# Patient Record
Sex: MALE | Race: OTHER | HISPANIC OR LATINO | Employment: FULL TIME | URBAN - METROPOLITAN AREA
[De-identification: names, ages, dates, MRNs, and addresses within clinical notes are randomized per-mention and may not be internally consistent; named-entity substitution may affect disease eponyms.]

---

## 2019-07-02 ENCOUNTER — TRANSCRIBE ORDERS (OUTPATIENT)
Dept: ADMINISTRATIVE | Facility: HOSPITAL | Age: 59
End: 2019-07-02

## 2019-07-02 DIAGNOSIS — I83.813 VARICOSE VEINS OF BILATERAL LOWER EXTREMITIES WITH PAIN: Primary | ICD-10-CM

## 2019-07-24 ENCOUNTER — CONSULT (OUTPATIENT)
Dept: VASCULAR SURGERY | Facility: CLINIC | Age: 59
End: 2019-07-24
Payer: COMMERCIAL

## 2019-07-24 VITALS
HEART RATE: 98 BPM | DIASTOLIC BLOOD PRESSURE: 80 MMHG | HEIGHT: 67 IN | BODY MASS INDEX: 30.29 KG/M2 | WEIGHT: 193 LBS | SYSTOLIC BLOOD PRESSURE: 110 MMHG | TEMPERATURE: 98.8 F

## 2019-07-24 DIAGNOSIS — I83.813 VARICOSE VEINS OF BILATERAL LOWER EXTREMITIES WITH PAIN: ICD-10-CM

## 2019-07-24 PROCEDURE — 99244 OFF/OP CNSLTJ NEW/EST MOD 40: CPT | Performed by: NURSE PRACTITIONER

## 2019-07-24 NOTE — PROGRESS NOTES
Assessment/Plan:    Varicose veins of bilateral lower extremities with pain  Symptomatic varicose veins to bilateral lower extremities, R>L, with intermittent pain  We discussed the pathophysiology of venous disease  He will trial a course of conservative therapy to include the daily use of 20-30 mmHg knee high compression stockings  We discussed the benefits of frequent elevation and regular physical activity  He will have a venous duplex to evaluate for venous incompetence  Followup with vascular surgeon after imaging to review test results, response to conservative measures and discuss further possible treatment options  Diagnoses and all orders for this visit:    Varicose veins of bilateral lower extremities with pain  -     Compression Stocking  -     Ambulatory referral to Vascular Surgery  -     VAS reflux lower limb venous duplex study with reflux assessment, complete bilateral; Future          Subjective:      Patient ID: Maryan Loo is a 63 y o  male  Patient referred by PCP for evaluation of varicose veins  He has bilateral lower extremity truncal varicosities  He complains of pain to the legs that has been ongoing for "years "  He feels like something has to be done now to help him feel better  He complains of sharp pain to the right leg overlying truncal vein to calf  He denies swelling  He works as a , so says he is constantly moving all day  His legs feel worse after work  He has never worn compression  No skin changes/tissue loss  New patient, presenting today for evaluation of varicose veins  Patient reports pain, bulging veins, swelling and burning, right is worse than left  He also reports R knee surgery on 6/13 and veins seem to have worsened since then  He does not wear compression and has not had any testing done         The following portions of the patient's history were reviewed and updated as appropriate: allergies, current medications, past family history, past medical history, past social history, past surgical history and problem list     Review of Systems   Constitutional: Negative  HENT: Positive for trouble swallowing  Eyes: Negative  Respiratory: Negative  Cardiovascular: Positive for leg swelling  Painful veins   Gastrointestinal: Negative  Endocrine: Negative  Genitourinary: Negative  Musculoskeletal: Positive for arthralgias  Leg pain   Skin: Negative  Allergic/Immunologic: Negative  Neurological: Negative  Hematological: Negative  Psychiatric/Behavioral: Negative  Objective:     Physical Exam   Constitutional: He is oriented to person, place, and time  He appears well-nourished  No distress  HENT:   Head: Normocephalic and atraumatic  Eyes: No scleral icterus  Neck: Neck supple  No JVD present  Cardiovascular: Normal rate and regular rhythm  Pulses:       Dorsalis pedis pulses are 2+ on the left side  Posterior tibial pulses are 2+ on the right side  Truncal varicosities bilateral lower extremities   Pulmonary/Chest: Effort normal  No respiratory distress  Abdominal: Soft  There is no tenderness  Musculoskeletal: He exhibits no edema  Neurological: He is alert and oriented to person, place, and time  Skin: Skin is warm and dry  Psychiatric: He has a normal mood and affect  I have reviewed and made appropriate changes to the review of systems input by the medical assistant      Vitals:    07/24/19 1459   BP: 110/80   BP Location: Right arm   Patient Position: Sitting   Pulse: 98   Temp: 98 8 °F (37 1 °C)   TempSrc: Tympanic   Weight: 87 5 kg (193 lb)   Height: 5' 7" (1 702 m)       Patient Active Problem List   Diagnosis    Varicose veins of bilateral lower extremities with pain       Past Surgical History:   Procedure Laterality Date    ADENOIDECTOMY      KNEE SURGERY      TONSILLECTOMY         Family History   Problem Relation Age of Onset  Varicose Veins Father        Social History     Socioeconomic History    Marital status: Single     Spouse name: Not on file    Number of children: Not on file    Years of education: Not on file    Highest education level: Not on file   Occupational History    Not on file   Social Needs    Financial resource strain: Not on file    Food insecurity:     Worry: Not on file     Inability: Not on file    Transportation needs:     Medical: Not on file     Non-medical: Not on file   Tobacco Use    Smoking status: Former Smoker     Types: Cigarettes    Smokeless tobacco: Never Used   Substance and Sexual Activity    Alcohol use: Yes     Frequency: Monthly or less    Drug use: Not Currently    Sexual activity: Not on file   Lifestyle    Physical activity:     Days per week: Not on file     Minutes per session: Not on file    Stress: Not on file   Relationships    Social connections:     Talks on phone: Not on file     Gets together: Not on file     Attends Judaism service: Not on file     Active member of club or organization: Not on file     Attends meetings of clubs or organizations: Not on file     Relationship status: Not on file    Intimate partner violence:     Fear of current or ex partner: Not on file     Emotionally abused: Not on file     Physically abused: Not on file     Forced sexual activity: Not on file   Other Topics Concern    Not on file   Social History Narrative    Not on file       No Known Allergies      Current Outpatient Medications:     atorvastatin (LIPITOR) 40 mg tablet, TAKE 1 TABLET(S) EVERY DAY BY ORAL ROUTE AT BEDTIME FOR 30 DAYS , Disp: , Rfl: 1    pantoprazole (PROTONIX) 40 mg tablet, Take 40 mg by mouth daily, Disp: , Rfl: 3

## 2019-07-24 NOTE — PATIENT INSTRUCTIONS
Symptomatic varicose veins to bilateral lower extremities with pain  -we will initiate a 90 day trial of conservative therapy to include the daily use of 20-30 mmHg knee-high compression stockings    Wear stockings daily during waking hours only, do not sleep in stockings, there is no benefit to sleeping in them  -other things we discussed that may help manage your symptoms:  Periodic elevation, regular physical activity and maintenance of a healthy weight  -after a trial of conservative therapy you have a venous reflux assessment to evaluate for venous insufficiency  -follow up with vascular surgeon after imaging to review test results, response to conservative therapy and discuss further possible surgical treatment options

## 2019-07-24 NOTE — ASSESSMENT & PLAN NOTE
Symptomatic varicose veins to bilateral lower extremities, R>L, with intermittent pain  We discussed the pathophysiology of venous disease  He will trial a course of conservative therapy to include the daily use of 20-30 mmHg knee high compression stockings  We discussed the benefits of frequent elevation and regular physical activity  He will have a venous duplex to evaluate for venous incompetence  Followup with vascular surgeon after imaging to review test results, response to conservative measures and discuss further possible treatment options

## 2019-07-30 ENCOUNTER — HOSPITAL ENCOUNTER (OUTPATIENT)
Dept: RADIOLOGY | Facility: HOSPITAL | Age: 59
Discharge: HOME | End: 2019-07-30
Attending: OTOLARYNGOLOGY
Payer: COMMERCIAL

## 2019-07-30 DIAGNOSIS — R13.10 DYSPHAGIA, UNSPECIFIED TYPE: ICD-10-CM

## 2019-07-30 PROCEDURE — G8998 SWALLOW D/C STATUS: HCPCS

## 2019-07-30 PROCEDURE — 92611 MOTION FLUOROSCOPY/SWALLOW: CPT

## 2019-07-30 PROCEDURE — 74230 X-RAY XM SWLNG FUNCJ C+: CPT

## 2019-07-30 PROCEDURE — G8996 SWALLOW CURRENT STATUS: HCPCS

## 2019-07-30 PROCEDURE — G8997 SWALLOW GOAL STATUS: HCPCS

## 2019-07-30 NOTE — PROCEDURES
Video Swallow Study      Patient Name: Vinicio Adam  YNQEX'V Date: 7/30/2019        Past Medical History  Past Medical History:   Diagnosis Date    GERD (gastroesophageal reflux disease)     Hyperlipidemia         Past Surgical History  Past Surgical History:   Procedure Laterality Date    ADENOIDECTOMY      KNEE SURGERY      TONSILLECTOMY           General Information:    64 yo male referred to [facility]  for a VBS by Dr Apollo Estrada for dysphagia w/  c/o choking on solids x6 months  Pt reports taking Protonix PRN, but has not been taking it frequently 2/2 decrease in symptoms/heartburn  Pt reported that he is aware that if he eats faster he has an increase in symptoms and will need to excuse himself to the bathroom and vomit the food back up  Cognition:  WFL    Speech/Swallow Mech: Oral motor movements appeared adequate; Dentition was adequate; Cough was strong/productive  Respiratory Status: RA;   Current diet: regular/thin  Prior VBS N/A    Pt was seen in radiology for a Video Barium Swallow Study, standing and viewed laterally with the following consistencies: puree, soft solid, hard solid, HTL, NTL, thin, barium tablet whole with thin by cup      Results are as follows:     **Images are available for review on PACS          Oral Stage:   Pt self fed w/o difficulty  Mastication was complete; however, did present with extremely fast oral processing of solids  Oral control, posterior transfer, and BOT retraction were adequate  No premature spill  Pharyngeal Stage:   Timely pharyngeal swallow with adequate hyoalryngeal excursion  Epiglottic inversion was minimally delayed but complete  High penetration with thin liquids via successive sips  No pharyngeal retention or aspiration  Esophageal Stage:   Screen completed  Narrowing in proximal esophagus near UES that did not impede passage of food/liquid   Possible early stages of esophageal bar forming  Retroflow of puree and liquid seen from distal esophagus to high mid region  The barium tablet did become stasis at UES impacting esophageal clearance of food and liquid  Delayed emptying of the food/liquid without passage of the barium tablet-required large glass of warm water over >10 minutes to break down tablet  Assessment Summary:   Pt presents with a functional oropharyngeal swallow  Symptoms may be more esophageal related 2/2 observed retroflow of material within the esophagus and decreased ability to pass the barium tablet through LES requiring over 10 minutes to clear and multiple bolus presentations including warm water to break down the tablet         Recommendations:   -Regular with thin liquids  -Slowed rate of intake and reflux precautions   -Consider crushing/breaking meds as able  -Reflux management per ENT/GI

## 2019-10-15 ENCOUNTER — TELEPHONE (OUTPATIENT)
Dept: ADMINISTRATIVE | Facility: HOSPITAL | Age: 59
End: 2019-10-15

## 2019-10-15 NOTE — TELEPHONE ENCOUNTER
Date Time Reggie Providers Departments Visit Type                      [x] 10/31/19 9:00 AM 30 Brittaney Llanes MD [[id number]] VAS CTR [facility] [[id number]] RES REVIEW [[id number]]     aiyana unavail - called pt to rs - left message

## 2019-10-29 ENCOUNTER — HOSPITAL ENCOUNTER (OUTPATIENT)
Dept: NON INVASIVE DIAGNOSTICS | Facility: CLINIC | Age: 59
Discharge: HOME | End: 2019-10-29
Payer: COMMERCIAL

## 2019-10-29 DIAGNOSIS — I83.813 VARICOSE VEINS OF BILATERAL LOWER EXTREMITIES WITH PAIN: ICD-10-CM

## 2019-10-29 PROCEDURE — 93970 EXTREMITY STUDY: CPT

## 2019-10-30 PROCEDURE — 93970 EXTREMITY STUDY: CPT | Performed by: SURGERY

## 2019-11-01 ENCOUNTER — OFFICE VISIT (OUTPATIENT)
Dept: VASCULAR SURGERY | Facility: CLINIC | Age: 59
End: 2019-11-01
Payer: COMMERCIAL

## 2019-11-01 VITALS
BODY MASS INDEX: 30.13 KG/M2 | HEIGHT: 67 IN | WEIGHT: 192 LBS | DIASTOLIC BLOOD PRESSURE: 92 MMHG | TEMPERATURE: 99.1 F | HEART RATE: 96 BPM | SYSTOLIC BLOOD PRESSURE: 144 MMHG

## 2019-11-01 DIAGNOSIS — I83.813 VARICOSE VEINS OF BILATERAL LOWER EXTREMITIES WITH PAIN: Primary | ICD-10-CM

## 2019-11-01 PROCEDURE — 99213 OFFICE O/P EST LOW 20 MIN: CPT | Performed by: SURGERY

## 2019-11-01 NOTE — PROGRESS NOTES
Assessment/Plan:    Pt is a 62 yo w/ HLD, GERD, presents to discuss varicose veins    Varicose veins of bilateral lower extremities with pain  -varicose veins with discomfort  -reviewed reflux study which shows no deep or superficial reflux  -has been doing conservative management with compression, elevation, activity and this is controlling his sxs well  -patient most concerned with cosmetic appearance of varicose veins  -discussed taht phlebectomy would remove his veins but is unnecessary at this point as symptoms are well controlled and no underlying reflux disease; these veins are too large for treatment with sclero injections    F/u: PRN    Subjective:      Patient ID: Frank Weiss is a 63 y o  male  Patient presents in office for follow up  Patient has an LEVDR done 10/29/2019  Patient reports wearing compression socks every other day  Patient reports that bulging veins and tingling/burning/stabbing pain are not as bad as they were  Patient reports the R>L x 3 + years  Patient reports that elevation of his legs helps  Patient is taking atorvastatin  Patient reports being a social smoker  HPI:    Patient presents to discuss venous disease  Patient complains of mild discomfort in his legs, achy in feeling, R>L  He denies swelling  He has a varicose vein on the right shin that is cosmetically unpleasing to him  He works as a  and does a lot of walking at work as well  Denies hx of DVT  Positive family hx of varicose veins in his father  Since last visit, he wears his compression ~3d/wk (tries to wear for work days)  He elevates his legs  He is active  Not losing weight  He thinks that these measures are helping the discomfort in his legs  He is still unhappy with the appearance of his bulging veins but does note that they are smaller since starting conservative measures      He smokes and drinks occasionally (1x/wk)      The following portions of the patient's history were reviewed and updated as appropriate: allergies, current medications, past family history, past medical history, past social history, past surgical history and problem list     Review of Systems   Constitutional: Negative  HENT: Negative  Eyes: Negative  Respiratory: Negative  Cardiovascular: Negative for leg swelling  Painful veins   Gastrointestinal: Negative  Endocrine: Negative  Genitourinary: Negative  Musculoskeletal: Negative  Negative for gait problem  Skin: Negative  Negative for color change and wound  Allergic/Immunologic: Negative  Neurological: Negative  Hematological: Negative  Psychiatric/Behavioral: Negative  Objective:      /92 (BP Location: Left arm, Patient Position: Sitting, Cuff Size: Adult)   Pulse 96   Temp 99 1 °F (37 3 °C) (Tympanic)   Ht 5' 7" (1 702 m)   Wt 87 1 kg (192 lb)   BMI 30 07 kg/m²          Physical Exam   Constitutional: He is oriented to person, place, and time  He appears well-developed and well-nourished  HENT:   Head: Normocephalic and atraumatic  Eyes: Conjunctivae are normal    Neck: Normal range of motion  Neck supple  Cardiovascular: Normal rate, regular rhythm and normal heart sounds  Pulses:       Radial pulses are 2+ on the right side, and 2+ on the left side  Popliteal pulses are 1+ on the right side, and 1+ on the left side  Dorsalis pedis pulses are 2+ on the right side, and 2+ on the left side  Posterior tibial pulses are 2+ on the right side, and 2+ on the left side  No carotid bruits B   Pulmonary/Chest: Effort normal and breath sounds normal    Abdominal: Soft  He exhibits no distension  There is no tenderness  There is no rebound  Musculoskeletal: Normal range of motion  He exhibits no edema  Neurological: He is alert and oriented to person, place, and time  Skin: Skin is warm and dry     Moderate size varicosity over the R anterolateral shin; sparse spiders BLE   Psychiatric: He has a normal mood and affect  His behavior is normal    Nursing note and vitals reviewed  I have reviewed and made appropriate changes to the review of systems input by the medical assistant      Vitals:    11/01/19 0927   BP: 144/92   BP Location: Left arm   Patient Position: Sitting   Cuff Size: Adult   Pulse: 96   Temp: 99 1 °F (37 3 °C)   TempSrc: Tympanic   Weight: 87 1 kg (192 lb)   Height: 5' 7" (1 702 m)       Patient Active Problem List   Diagnosis    Varicose veins of bilateral lower extremities with pain       Past Surgical History:   Procedure Laterality Date    ADENOIDECTOMY      KNEE SURGERY      TONSILLECTOMY         Family History   Problem Relation Age of Onset    Varicose Veins Father        Social History     Socioeconomic History    Marital status: Single     Spouse name: Not on file    Number of children: Not on file    Years of education: Not on file    Highest education level: Not on file   Occupational History    Not on file   Social Needs    Financial resource strain: Not on file    Food insecurity:     Worry: Not on file     Inability: Not on file    Transportation needs:     Medical: Not on file     Non-medical: Not on file   Tobacco Use    Smoking status: Current Some Day Smoker     Types: Cigarettes    Smokeless tobacco: Never Used   Substance and Sexual Activity    Alcohol use: Yes     Frequency: Monthly or less    Drug use: Not Currently    Sexual activity: Not on file   Lifestyle    Physical activity:     Days per week: Not on file     Minutes per session: Not on file    Stress: Not on file   Relationships    Social connections:     Talks on phone: Not on file     Gets together: Not on file     Attends Latter day service: Not on file     Active member of club or organization: Not on file     Attends meetings of clubs or organizations: Not on file     Relationship status: Not on file    Intimate partner violence:     Fear of current or ex partner: Not on file     Emotionally abused: Not on file     Physically abused: Not on file     Forced sexual activity: Not on file   Other Topics Concern    Not on file   Social History Narrative    Not on file       No Known Allergies      Current Outpatient Medications:     atorvastatin (LIPITOR) 40 mg tablet, TAKE 1 TABLET(S) EVERY DAY BY ORAL ROUTE AT BEDTIME FOR 30 DAYS , Disp: , Rfl: 1    pantoprazole (PROTONIX) 40 mg tablet, Take 40 mg by mouth daily, Disp: , Rfl: 3

## 2019-11-01 NOTE — PATIENT INSTRUCTIONS
1) Varicose veins  -your ultrasound showed that you don't have any underlying vein disease  -please continue to wear your compression daily, doing leg elevation, stay active, and get down to a healthy weight  -if your symptoms worsen, please make another appointment to see us    Varicose Veins   AMBULATORY CARE:   Varicose veins  are veins that become large, twisted, and swollen  They are common on the back of the calves, knees, and thighs  Varicose veins are caused by valves in your veins that do not work properly  This causes blood to collect and increase pressure in the veins of your legs  The increased pressure causes your veins to stretch, get larger, swell, and twist        Common symptoms include the following: Your symptoms may be worse after you stand or sit for long periods of time  You may have any of the following:  · Blue, purple, or bulging veins in your legs     · Pain, swelling, or muscle cramps in your legs    · Feeling of fatigue or heaviness in your legs    · Cramping in your legs  Seek care immediately if:   · You have a wound that does not heal or is infected  · You have an injury that has broken your skin and caused your varicose veins to bleed  · Your leg is swollen and hard  · You notice that your legs or feet are turning blue or black  · Your leg feels warm, tender, and painful  It may look swollen and red  Contact your healthcare provider if:   · You have pain in your leg that does not go away or gets worse  · You notice sudden large bruising on your legs  · You have a rash on your leg  · Your symptoms keep you from doing your daily activities  · You have questions or concerns about your condition or care  Treatment of varicose veins  aims to decrease symptoms, improve appearance, and prevent further problems  Treatment will depend on which veins are affected and how severe your condition is  You may need procedures to treat or remove your varicose veins   For example, your healthcare provider may inject a solution or use a laser to close the varicose veins  Surgery to remove long veins may also be done  Ask your healthcare provider for more information about procedures used to treat varicose veins  Manage varicose veins:   · Do not sit or stand for long periods of time  This can cause the blood to collect in your legs and make your symptoms worse  Bend or rotate your ankles several times every hour  Walk around for a few minutes every hour to get blood moving in your legs  · Do not cross your legs when you sit  This decreases blood flow to your feet and can make your symptoms worse  · Do not wear tight clothing or shoes  Do not wear high-heeled shoes  Do not wear clothes that are tight around the waist or knees  · Maintain a healthy weight  Being overweight or obese can make your varicose veins worse  Ask your healthcare provider how much you should weigh  Ask him or her to help you create a weight loss plan if you are overweight  · Wear pressure stockings as directed  The stockings are tight and put pressure on your legs  They improve blood flow and help prevent clots  · Elevate your legs  Keep them above the level of your heart for 15 to 30 minutes several times a day  You can also prop the end of your bed up slightly to elevate your legs while you sleep  This will help blood to flow back to your heart  · Get regular exercise  Talk to your healthcare provider about the best exercise plan for you  Exercise can improve blood flow to your legs and feet  Follow up with your healthcare provider as directed:  Write down your questions so you remember to ask them during your visits  © 2017 [employer] [facility] Information is for End User's use only and may not be sold, redistributed or otherwise used for commercial purposes   All illustrations and images included in CareNotes® are the copyrighted property of A D [employer] M [employer] Inc  or [employer]  The above information is an  only  It is not intended as medical advice for individual conditions or treatments  Talk to your doctor, nurse or pharmacist before following any medical regimen to see if it is safe and effective for you

## 2020-04-18 DIAGNOSIS — E78.00 HYPERCHOLESTEROLEMIA: Primary | ICD-10-CM

## 2020-04-18 RX ORDER — ATORVASTATIN CALCIUM 40 MG/1
TABLET, FILM COATED ORAL
Qty: 30 TABLET | Refills: 1 | Status: SHIPPED | OUTPATIENT
Start: 2020-04-18 | End: 2020-05-19

## 2020-05-19 DIAGNOSIS — E78.00 HYPERCHOLESTEROLEMIA: ICD-10-CM

## 2020-05-19 RX ORDER — ATORVASTATIN CALCIUM 40 MG/1
TABLET, FILM COATED ORAL
Qty: 30 TABLET | Refills: 1 | Status: SHIPPED | OUTPATIENT
Start: 2020-05-19 | End: 2020-06-13

## 2020-05-23 DIAGNOSIS — K21.9 GASTROESOPHAGEAL REFLUX DISEASE, ESOPHAGITIS PRESENCE NOT SPECIFIED: Primary | ICD-10-CM

## 2020-05-24 RX ORDER — PANTOPRAZOLE SODIUM 40 MG/1
TABLET, DELAYED RELEASE ORAL
Qty: 30 TABLET | Refills: 1 | Status: SHIPPED | OUTPATIENT
Start: 2020-05-24 | End: 2020-06-20

## 2020-06-13 DIAGNOSIS — E78.00 HYPERCHOLESTEROLEMIA: ICD-10-CM

## 2020-06-13 RX ORDER — ATORVASTATIN CALCIUM 40 MG/1
TABLET, FILM COATED ORAL
Qty: 30 TABLET | Refills: 1 | Status: SHIPPED | OUTPATIENT
Start: 2020-06-13 | End: 2020-06-27

## 2020-06-20 DIAGNOSIS — K21.9 GASTROESOPHAGEAL REFLUX DISEASE, ESOPHAGITIS PRESENCE NOT SPECIFIED: ICD-10-CM

## 2020-06-20 RX ORDER — PANTOPRAZOLE SODIUM 40 MG/1
TABLET, DELAYED RELEASE ORAL
Qty: 30 TABLET | Refills: 1 | Status: SHIPPED | OUTPATIENT
Start: 2020-06-20 | End: 2020-06-27

## 2020-06-27 DIAGNOSIS — E78.00 HYPERCHOLESTEROLEMIA: ICD-10-CM

## 2020-06-27 DIAGNOSIS — K21.9 GASTROESOPHAGEAL REFLUX DISEASE, ESOPHAGITIS PRESENCE NOT SPECIFIED: ICD-10-CM

## 2020-06-27 PROBLEM — K22.2 SCHATZKI'S RING OF DISTAL ESOPHAGUS: Status: ACTIVE | Noted: 2020-06-27

## 2020-06-27 PROBLEM — K22.70 BARRETT'S ESOPHAGUS WITHOUT DYSPLASIA: Status: ACTIVE | Noted: 2020-06-27

## 2020-06-27 PROBLEM — M17.0 OSTEOARTHRITIS OF BOTH KNEES: Status: ACTIVE | Noted: 2020-06-27

## 2020-06-27 PROBLEM — K63.5 COLON POLYPS: Status: ACTIVE | Noted: 2020-06-27

## 2020-06-27 RX ORDER — PANTOPRAZOLE SODIUM 40 MG/1
TABLET, DELAYED RELEASE ORAL
Qty: 90 TABLET | Refills: 1 | Status: SHIPPED | OUTPATIENT
Start: 2020-06-27 | End: 2020-06-27 | Stop reason: SDUPTHER

## 2020-06-27 RX ORDER — PANTOPRAZOLE SODIUM 40 MG/1
40 TABLET, DELAYED RELEASE ORAL DAILY
Qty: 90 TABLET | Refills: 1 | Status: SHIPPED | OUTPATIENT
Start: 2020-06-27 | End: 2021-05-14

## 2020-06-27 RX ORDER — ATORVASTATIN CALCIUM 40 MG/1
40 TABLET, FILM COATED ORAL
Qty: 90 TABLET | Refills: 1 | Status: SHIPPED | OUTPATIENT
Start: 2020-06-27 | End: 2021-08-11

## 2020-06-27 RX ORDER — ATORVASTATIN CALCIUM 40 MG/1
TABLET, FILM COATED ORAL
Qty: 90 TABLET | Refills: 1 | Status: SHIPPED | OUTPATIENT
Start: 2020-06-27 | End: 2020-06-27 | Stop reason: SDUPTHER

## 2021-01-21 ENCOUNTER — TELEPHONE (OUTPATIENT)
Dept: ADMINISTRATIVE | Facility: OTHER | Age: 61
End: 2021-01-21

## 2021-01-21 NOTE — TELEPHONE ENCOUNTER
----- Message from Destinee Guerra sent at 1/20/2021 11:33 AM EST -----  Regarding: care gap request  01/20/21 11:33 AM    Hello, our patient attached above has had colonoscopy completed/performed  Please assist in updating the patient chart by closing the care gap  The date of service is 10/03/2019 and is under media date 12/28/2020 in his chart      Thank you,  Destinee Guerra  [facility]

## 2021-01-21 NOTE — TELEPHONE ENCOUNTER
Upon review of the In Basket request we were able to locate, review, and update the patient chart as requested for CRC: Colonoscopy  Any additional questions or concerns should be emailed to the Practice Liaisons via [email or web address] email, please do not reply via In Basket      Thank you  Deysi Ayala

## 2021-03-25 ENCOUNTER — TELEMEDICINE (OUTPATIENT)
Dept: FAMILY MEDICINE CLINIC | Facility: CLINIC | Age: 61
End: 2021-03-25
Payer: COMMERCIAL

## 2021-03-25 VITALS — HEIGHT: 67 IN | TEMPERATURE: 100 F | WEIGHT: 190 LBS | BODY MASS INDEX: 29.82 KG/M2

## 2021-03-25 DIAGNOSIS — B34.9 VIRAL INFECTION, UNSPECIFIED: ICD-10-CM

## 2021-03-25 DIAGNOSIS — Z20.822 EXPOSURE TO COVID-19 VIRUS: ICD-10-CM

## 2021-03-25 PROCEDURE — 99213 OFFICE O/P EST LOW 20 MIN: CPT | Performed by: FAMILY MEDICINE

## 2021-03-25 PROCEDURE — U0003 INFECTIOUS AGENT DETECTION BY NUCLEIC ACID (DNA OR RNA); SEVERE ACUTE RESPIRATORY SYNDROME CORONAVIRUS 2 (SARS-COV-2) (CORONAVIRUS DISEASE [COVID-19]), AMPLIFIED PROBE TECHNIQUE, MAKING USE OF HIGH THROUGHPUT TECHNOLOGIES AS DESCRIBED BY CMS-2020-01-R: HCPCS | Performed by: FAMILY MEDICINE

## 2021-03-25 PROCEDURE — U0005 INFEC AGEN DETEC AMPLI PROBE: HCPCS | Performed by: FAMILY MEDICINE

## 2021-03-25 PROCEDURE — 3008F BODY MASS INDEX DOCD: CPT | Performed by: FAMILY MEDICINE

## 2021-03-25 NOTE — PROGRESS NOTES
COVID-19 Virtual Visit     Assessment/Plan:    Problem List Items Addressed This Visit     None      Visit Diagnoses     Exposure to COVID-19 virus        Relevant Orders    Novel Coronavirus (Covid-19),PCR [facility] - Collected at [facility]    Viral infection, unspecified        Relevant Orders    Novel Coronavirus (Covid-19),PCR [facility] - Collected at [facility] or [facility]         Disposition:     I referred patient to one of our centralized sites for a COVID-19 swab  Check for COVID- 19, discussed using masks, hand washing, avoiding crowded places, limiting social contact, go to ER if you develop -chest pain,fever>103 5,shortness of breath, lightheadedness    I have advised supportive treatment over-the-counter decongestant DayQuil ,Motrin pain or fever, advised to stay hydrated   Told to follow-up if the symptoms do not improve   Will follow-up with the lab results   I have given work excuse till the results are back  Recommend not to work until tests are back , given his exposure, high probability of COVID-19 infection  I have spent 15 minutes directly with the patient  Greater than 50% of this time was spent in counseling/coordination of care regarding: prognosis, risks and benefits of treatment options, instructions for management, patient and family education and impressions  Encounter provider Douglas Blas MD    Provider located at [address] [place] [id number] [phone]    Recent Visits  No visits were found meeting these conditions  Showing recent visits within past 7 days and meeting all other requirements     Today's Visits  Date Type Provider Dept   03/25/21 Telemedicine Douglas Blas MD  [facility] today's visits and meeting all other requirements     Future Appointments  No visits were found meeting these conditions     Showing future appointments within next 150 days and meeting all other requirements      This virtual check-in was done via [employer] and patient was informed that this is a secure, HIPAA-compliant platform  He agrees to proceed  Patient agrees to participate in a virtual check in via telephone or video visit instead of presenting to the office to address urgent/immediate medical needs  Patient is aware this is a billable service  After connecting through [facility], the patient was identified by name and date of birth  Jesus Delgadillo was informed that this was a telemedicine visit and that the exam was being conducted confidentially over secure lines  My office door was closed  No one else was in the room  Jesus Delgadillo acknowledged consent and understanding of privacy and security of the telemedicine visit  I informed the patient that I have reviewed his record in Epic and presented the opportunity for him to ask any questions regarding the visit today  The patient agreed to participate  Subjective:   Jesus Delgadillo is a 62 y o  male who is concerned about COVID-19  Patient's symptoms include fever, nasal congestion, sore throat, loss of taste, cough, diarrhea and headache  Patient denies chills, fatigue, malaise, rhinorrhea, anosmia, shortness of breath, chest tightness, abdominal pain, nausea, vomiting and myalgias       Date of symptom onset: 3/23/2021  Date of exposure: 3/20/2021    Exposure:   Contact with a person who is under investigation (PUI) for or who is positive for COVID-19 within the last 14 days?: Yes    Hospitalized recently for fever and/or lower respiratory symptoms?: No      Currently a healthcare worker that is involved in direct patient care?: No      Works in a special setting where the risk of COVID-19 transmission may be high? (this may include long-term care, correctional and care home facilities; homeless shelters; assisted-living facilities and group homes ): No      Resident in a special setting where the risk of COVID-19 transmission may be high? (this may include long-term care, correctional and shelter facilities; homeless shelters; assisted-living facilities and group homes ): No      Lab Results   Component Value Date    [facility] Not Detected 01/08/2021     Past Medical History:   Diagnosis Date    GERD (gastroesophageal reflux disease)     Hyperlipidemia      Past Surgical History:   Procedure Laterality Date    ADENOIDECTOMY      KNEE SURGERY      TONSILLECTOMY       Current Outpatient Medications   Medication Sig Dispense Refill    atorvastatin (LIPITOR) 40 mg tablet Take 1 tablet (40 mg total) by mouth daily at bedtime 90 tablet 1    pantoprazole (PROTONIX) 40 mg tablet Take 1 tablet (40 mg total) by mouth daily 90 tablet 1     No current facility-administered medications for this visit  No Known Allergies    Review of Systems   Constitutional: Positive for fever  Negative for chills and fatigue  HENT: Positive for congestion and sore throat  Negative for rhinorrhea  Respiratory: Positive for cough  Negative for chest tightness and shortness of breath  Gastrointestinal: Positive for diarrhea  Negative for abdominal pain, nausea and vomiting  Musculoskeletal: Negative for myalgias  Neurological: Positive for headaches  Objective:    Vitals:    03/25/21 1117   Temp: 100 °F (37 8 °C)   TempSrc: Temporal   Weight: 86 2 kg (190 lb)   Height: 5' 7" (1 702 m)       Physical Exam  Vitals signs and nursing note reviewed  Constitutional:       General: He is not in acute distress  Appearance: He is well-developed  He is obese  He is not ill-appearing or toxic-appearing  HENT:      Head: Normocephalic and atraumatic  Right Ear: External ear normal       Left Ear: External ear normal       Nose: Congestion and rhinorrhea present  Mouth/Throat:      Mouth: Mucous membranes are not pale and not cyanotic  Eyes:      General:         Right eye: No discharge           Left eye: No discharge  Comments: Visual inspection   Neck:      Musculoskeletal: Normal range of motion  Pulmonary:      Effort: Pulmonary effort is normal  No respiratory distress  Abdominal:      Palpations: Abdomen is soft  Tenderness: There is no abdominal tenderness  Musculoskeletal:         General: No tenderness or signs of injury  Skin:     Coloration: Skin is not jaundiced or pale  Neurological:      General: No focal deficit present  Mental Status: Mental status is at baseline  Psychiatric:         Behavior: Behavior normal          Thought Content: Thought content normal        VIRTUAL VISIT DISCLAIMER    Gregoria Warren acknowledges that he has consented to an online visit or consultation  He understands that the online visit is based solely on information provided by him, and that, in the absence of a face-to-face physical evaluation by the physician, the diagnosis he receives is both limited and provisional in terms of accuracy and completeness  This is not intended to replace a full medical face-to-face evaluation by the physician  Gregoria Warren understands and accepts these terms

## 2021-03-26 ENCOUNTER — TELEPHONE (OUTPATIENT)
Dept: FAMILY MEDICINE CLINIC | Facility: CLINIC | Age: 61
End: 2021-03-26

## 2021-03-26 ENCOUNTER — TELEMEDICINE (OUTPATIENT)
Dept: FAMILY MEDICINE CLINIC | Facility: CLINIC | Age: 61
End: 2021-03-26
Payer: COMMERCIAL

## 2021-03-26 DIAGNOSIS — U07.1 COVID-19 VIRUS INFECTION: Primary | ICD-10-CM

## 2021-03-26 LAB — SARS-COV-2 RNA RESP QL NAA+PROBE: POSITIVE

## 2021-03-26 PROCEDURE — 99213 OFFICE O/P EST LOW 20 MIN: CPT | Performed by: FAMILY MEDICINE

## 2021-03-26 NOTE — TELEPHONE ENCOUNTER
Patient is requesting a call back, positive COVID no appointments to schedule patient would like a call back for advice on protocols to follow and if medications may be called in for diarrhea, body aches with sore throat   ND

## 2021-03-26 NOTE — ASSESSMENT & PLAN NOTE
Pt ill for 4-5 days no sob cont otc meds prn to ER if any sob or sudden chest pain or deterioration quarantine for 10 days from onset of symptoms and symptom free for 48 hrs

## 2021-03-26 NOTE — PROGRESS NOTES
Virtual Regular Visit      Assessment/Plan:    Problem List Items Addressed This Visit        Other    COVID-19 virus infection - Primary     Pt ill for 4-5 days no sob cont otc meds prn to ER if any sob or sudden chest pain or deterioration quarantine for 10 days from onset of symptoms and symptom free for 48 hrs                    Reason for visit is   Chief Complaint   Patient presents with    Virtual Regular Visit        Encounter provider Bradly Meek MD    Provider located at [address] [place] [address]  [address] [place] [id number] [phone]      Recent Visits  Date Type Provider Dept   03/25/21 Telemedicine Magno Marin MD [facility] recent visits within past 7 days and meeting all other requirements     Today's Visits  Date Type Provider Dept   03/26/21 Telemedicine Bradly Meek MD [facility] today's visits and meeting all other requirements     Future Appointments  No visits were found meeting these conditions  Showing future appointments within next 150 days and meeting all other requirements        The patient was identified by name and date of birth  Kaitlyn Montano was informed that this is a telemedicine visit and that the visit is being conducted [place] [facility] and patient was informed that this is a secure, HIPAA-compliant platform  He agrees to proceed     My office door was closed  No one else was in the room  He acknowledged consent and understanding of privacy and security of the video platform  The patient has agreed to participate and understands they can discontinue the visit at any time  Patient is aware this is a billable service  Subjective  Kaitlyn Montano is a 62 y o  male pt tested positive for covid yesterday has been ill for 4 days  Has fever congestion loss of taste no sob          HPI     Past Medical History:   Diagnosis Date    GERD (gastroesophageal reflux disease)     Hyperlipidemia        Past Surgical History:   Procedure Laterality Date    ADENOIDECTOMY      KNEE SURGERY      TONSILLECTOMY         Current Outpatient Medications   Medication Sig Dispense Refill    atorvastatin (LIPITOR) 40 mg tablet Take 1 tablet (40 mg total) by mouth daily at bedtime 90 tablet 1    pantoprazole (PROTONIX) 40 mg tablet Take 1 tablet (40 mg total) by mouth daily 90 tablet 1     No current facility-administered medications for this visit  No Known Allergies    Review of Systems   Constitutional: Positive for fatigue and fever  Negative for appetite change and chills  HENT: Positive for congestion  Negative for rhinorrhea, sinus pain and sore throat  Eyes: Negative for discharge  Respiratory: Positive for cough  Negative for shortness of breath and wheezing  Cardiovascular: Negative for chest pain and palpitations  Gastrointestinal: Negative for abdominal pain, diarrhea, nausea and vomiting  Musculoskeletal: Negative for myalgias  Neurological: Negative for headaches  Psychiatric/Behavioral: Negative for dysphoric mood  The patient is not nervous/anxious  Video Exam    There were no vitals filed for this visit  Physical Exam  Constitutional:       General: He is not in acute distress  Appearance: Normal appearance  He is well-developed  He is not ill-appearing  Eyes:      Extraocular Movements: Extraocular movements intact  Neck:      Musculoskeletal: Normal range of motion  Thyroid: No thyromegaly  Cardiovascular:      Rate and Rhythm: Normal rate  Pulmonary:      Effort: Pulmonary effort is normal  No respiratory distress  Breath sounds: Normal breath sounds  Neurological:      General: No focal deficit present  Mental Status: He is alert and oriented to person, place, and time  Mental status is at baseline     Psychiatric:         Mood and Affect: Mood normal          Behavior: Behavior normal           I spent 15 minutes directly with the patient during this visit      VIRTUAL VISIT DISCLAIMER    Froilan Steen acknowledges that he has consented to an online visit or consultation  He understands that the online visit is based solely on information provided by him, and that, in the absence of a face-to-face physical evaluation by the physician, the diagnosis he receives is both limited and provisional in terms of accuracy and completeness  This is not intended to replace a full medical face-to-face evaluation by the physician  Froilan Steen understands and accepts these terms

## 2021-03-29 NOTE — RESULT ENCOUNTER NOTE
I spoke with [place] and let him know that his COVID-19 swab was positive  Continue symptomatic treatment  Advised he implement home isolation measures including      Staying home  Stay in a specific "sick room" or area and away from other people or animals, including pets  Wear a mask when leaving your room  Use a separate bathroom, if available  Wipe down all commonly touched surfaces with household   Please schedule follow up video visit thursday

## 2021-05-14 DIAGNOSIS — K21.9 GASTROESOPHAGEAL REFLUX DISEASE: ICD-10-CM

## 2021-05-14 RX ORDER — PANTOPRAZOLE SODIUM 40 MG/1
TABLET, DELAYED RELEASE ORAL
Qty: 90 TABLET | Refills: 1 | Status: SHIPPED | OUTPATIENT
Start: 2021-05-14 | End: 2021-11-08

## 2021-05-19 RX ORDER — ASPIRIN 81 MG/1
81 TABLET, CHEWABLE ORAL 2 TIMES DAILY
Status: ON HOLD | COMMUNITY
Start: 2021-01-20 | End: 2021-08-13 | Stop reason: CLARIF

## 2021-05-21 ENCOUNTER — OFFICE VISIT (OUTPATIENT)
Dept: VASCULAR SURGERY | Facility: CLINIC | Age: 61
End: 2021-05-21
Payer: COMMERCIAL

## 2021-05-21 VITALS
DIASTOLIC BLOOD PRESSURE: 78 MMHG | WEIGHT: 198 LBS | TEMPERATURE: 98.1 F | SYSTOLIC BLOOD PRESSURE: 126 MMHG | HEART RATE: 80 BPM | HEIGHT: 67 IN | BODY MASS INDEX: 31.08 KG/M2

## 2021-05-21 DIAGNOSIS — I83.813 VARICOSE VEINS OF BILATERAL LOWER EXTREMITIES WITH PAIN: Primary | ICD-10-CM

## 2021-05-21 PROCEDURE — 99213 OFFICE O/P EST LOW 20 MIN: CPT | Performed by: NURSE PRACTITIONER

## 2021-05-21 RX ORDER — MELOXICAM 15 MG/1
15 TABLET ORAL 2 TIMES DAILY PRN
Status: ON HOLD | COMMUNITY
End: 2021-08-13 | Stop reason: ALTCHOICE

## 2021-05-21 NOTE — PROGRESS NOTES
Assessment/Plan:      Problem List Items Addressed This Visit        Cardiovascular and Mediastinum    Varicose veins of bilateral lower extremities with pain - Primary     73-year-old male with GERD, hyperlipidemia, osteoarthritis status post right total knee replacement, bilateral lower extremity varicosities with significant pain and discomfort, R>L returns for evaluation of worsening symptoms  -Patient was previously evaluated with reflux study which did not show any significant deep or superficial venous incompetency   -Initially symptoms controlled with compression stockings though at this point not alleviating his symptoms   -Throbbing discomfort right shin on a daily basis  -Will evaluate with reflux study now and return to the office with vascular surgeon to review study and discuss treatment options           Relevant Orders    VAS reflux lower limb venous duplex study with reflux assessment, complete bilateral            Subjective:      Patient ID: Manny Blanchard is a 62 y o  male     Chief Complaint: Patient reports BLE R>L VV w/ pain, swelling and discomfort that has worsened over the last 3 months since he had R knee replacement  Wearing compression daily w/ minimal relief  Presents today for further evaluation         HPI    Manny Blanchard is seen for evaluation of: [x]Varicose veins/legs  []Spider veins/legs  []Spider veins/face  []Venous stasis ulcer  []Superficial thrombophlebitis  []Other -      He complains of []none  [x]bulging veins  []dilated veins  []discolored veins         There is [x]no edema              []right leg edema  []left leg edema       []bilateral lower extremity edema     There is   []no leg pain          []right leg pain  []left leg pain         []bilateral leg pain  []bilateral leg pain; L>R   [x]bilateral leg pain; R>L     Pain is described as [x]aching              []itching  []sharp                []burning  [x]throbbing         []stinging  [x]heavy []dull  []other -      Symptoms have been ongoing for:  Years, worsened over the last 3 month   There is  [x]no pertinent medical history  []history of DVT  []PE  []superficial venous thrombosis     Prior treatment includes []none  []EVLT  []OTC stockings  [x]prescription compression stockings  []vein ligation  []vein stripping  []stab phlebectomy  []sclerotherapy injections  []Other -      Current treatment includes []none  [x]compression socks  []avoiding tight clothing  [x]leg elevation  [x]rest  []exercise  [x]weight management  []skin care  [x]periodic evaluation   []Other-     Treatment has been []effective  [x]ineffective     Patient presents is seen in 2019 for painful varicosities  He returns to the office today for evaluation of symptoms  He is wearing compression stockings though notices over the last several months that the right leg has significantly worsened  He has throbbing and aching discomfort at the right shin varicosities despite the use of compression stockings  We symptoms worse at the end of the day  He operates a forklift  Spends many hours on his feet  He denies any significant lower extremity swelling  Review of Systems      The following portions of the patient's history were reviewed and updated as appropriate: allergies, current medications, past family history, past medical history, past social history, past surgical history and problem list   Review of systems reviewed    Review of Systems   Constitutional: Negative  HENT: Negative  Eyes: Negative  Respiratory: Negative  Cardiovascular: Positive for leg swelling  Painful veins   Gastrointestinal: Negative  Endocrine: Negative  Genitourinary: Negative  Musculoskeletal:        Leg pain   Skin: Negative  Allergic/Immunologic: Negative  Neurological: Negative  Hematological: Negative  Psychiatric/Behavioral: Negative             Objective     Physical Exam  Vitals signs and nursing note reviewed  Constitutional:       Appearance: Normal appearance  HENT:      Head: Normocephalic and atraumatic  Eyes:      Extraocular Movements: Extraocular movements intact  Cardiovascular:      Pulses: Normal pulses  Popliteal pulses are 2+ on the right side and 2+ on the left side  Dorsalis pedis pulses are 2+ on the right side and 2+ on the left side  Heart sounds: Normal heart sounds  Pulmonary:      Effort: Pulmonary effort is normal       Breath sounds: Normal breath sounds  Abdominal:      General: Bowel sounds are normal       Palpations: Abdomen is soft  Musculoskeletal:         General: No swelling  Comments: Prominent varicosities right below the knee anterior lower leg   Skin:     General: Skin is warm  Neurological:      General: No focal deficit present  Mental Status: He is alert and oriented to person, place, and time     Psychiatric:         Behavior: Behavior normal        Objective:     Vitals:    05/21/21 0853   BP: 126/78   BP Location: Right arm   Patient Position: Sitting   Pulse: 80   Temp: 98 1 °F (36 7 °C)   TempSrc: Tympanic   Weight: 89 8 kg (198 lb)   Height: 5' 7" (1 702 m)       Patient Active Problem List   Diagnosis    Varicose veins of bilateral lower extremities with pain    GERD (gastroesophageal reflux disease)    Hypercholesterolemia    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    Colon polyps    Schatzki's ring of distal esophagus    Osteoarthritis of both knees    COVID-19 virus infection       Past Surgical History:   Procedure Laterality Date    ADENOIDECTOMY      KNEE SURGERY      TONSILLECTOMY         Family History   Problem Relation Age of Onset    Varicose Veins Father        Social History     Socioeconomic History    Marital status: Single     Spouse name: Not on file    Number of children: Not on file    Years of education: Not on file    Highest education level: Not on file   Occupational History    Not on file   Social Needs    Financial resource strain: Not on file    Food insecurity     Worry: Not on file     Inability: Not on file    Transportation needs     Medical: Not on file     Non-medical: Not on file   Tobacco Use    Smoking status: Current Some Day Smoker     Types: Cigarettes    Smokeless tobacco: Never Used   Substance and Sexual Activity    Alcohol use: Yes     Frequency: Monthly or less    Drug use: Not Currently    Sexual activity: Not on file   Lifestyle    Physical activity     Days per week: Not on file     Minutes per session: Not on file    Stress: Not on file   Relationships    Social connections     Talks on phone: Not on file     Gets together: Not on file     Attends [employer] service: Not on file     Active member of club or organization: Not on file     Attends meetings of clubs or organizations: Not on file     Relationship status: Not on file    Intimate partner violence     Fear of current or ex partner: Not on file     Emotionally abused: Not on file     Physically abused: Not on file     Forced sexual activity: Not on file   Other Topics Concern    Not on file   Social History Narrative    Most recent tobacco use screenin2018       No Known Allergies      Current Outpatient Medications:     aspirin 81 mg chewable tablet, Chew 81 mg 2 (two) times a day, Disp: , Rfl:     atorvastatin (LIPITOR) 40 mg tablet, Take 1 tablet (40 mg total) by mouth daily at bedtime, Disp: 90 tablet, Rfl: 1    meloxicam (MOBIC) 15 mg tablet, Take 15 mg by mouth 2 (two) times a day as needed, Disp: , Rfl:     pantoprazole (PROTONIX) 40 mg tablet, TAKE 1 TABLET BY MOUTH EVERY DAY, Disp: 90 tablet, Rfl: 1    Vitals:    21 0853   BP: 126/78   Pulse: 80   Temp: 98 1 °F (36 7 °C)

## 2021-05-21 NOTE — PATIENT INSTRUCTIONS
Will evaluate your legs with to see if the veins are not working properly  Return to the office with surgeon to discuss findings and possible treatment options  In the meantime continue using your compression socks      Varicose Veins   AMBULATORY CARE:   Varicose veins  are veins that become large, twisted, and swollen  They are common on the back of the calves, knees, and thighs  Varicose veins are caused by valves in your veins that do not work properly  This causes blood to collect and increase pressure in the veins of your legs  The increased pressure causes your veins to stretch, get larger, swell, and twist   Common symptoms include the following: Your symptoms may be worse after you stand or sit for long periods of time  You may have any of the following:  · Blue, purple, or bulging veins in your legs     · Pain, swelling, or muscle cramps in your legs    · Feeling of fatigue or heaviness in your legs    · Cramping in your legs    Seek care immediately if:   · You have a wound that does not heal or is infected  · You have an injury that has broken your skin and caused your varicose veins to bleed  · Your leg is swollen and hard  · You notice that your legs or feet are turning blue or black  · Your leg feels warm, tender, and painful  It may look swollen and red  Contact your healthcare provider if:   · You have pain in your leg that does not go away or gets worse  · You notice sudden large bruising on your legs  · You have a rash on your leg  · Your symptoms keep you from doing your daily activities  · You have questions or concerns about your condition or care  Treatment of varicose veins  aims to decrease symptoms, improve appearance, and prevent further problems  Treatment will depend on which veins are affected and how severe your condition is  You may need procedures to treat or remove your varicose veins   For example, your healthcare provider may inject a solution or use a laser to close the varicose veins  Surgery to remove long veins may also be done  Ask your healthcare provider for more information about procedures used to treat varicose veins  Manage varicose veins:   · Do not sit or stand for long periods of time  This can cause the blood to collect in your legs and make your symptoms worse  Bend or rotate your ankles several times every hour  Walk around for a few minutes every hour to get blood moving in your legs  · Do not cross your legs when you sit  This decreases blood flow to your feet and can make your symptoms worse  · Do not wear tight clothing or shoes  Do not wear high-heeled shoes  Do not wear clothes that are tight around the waist or knees  · Maintain a healthy weight  Being overweight or obese can make your varicose veins worse  Ask your healthcare provider how much you should weigh  Ask him or her to help you create a weight loss plan if you are overweight  · Wear pressure stockings as directed  The stockings are tight and put pressure on your legs  They improve blood flow and help prevent clots  · Elevate your legs  Keep them above the level of your heart for 15 to 30 minutes several times a day  You can also prop the end of your bed up slightly to elevate your legs while you sleep  This will help blood to flow back to your heart  · Get regular exercise  Talk to your healthcare provider about the best exercise plan for you  Exercise can improve blood flow to your legs and feet  Follow up with your healthcare provider as directed:  Write down your questions so you remember to ask them during your visits  © Copyright [address] Information is for End User's use only and may not be sold, redistributed or otherwise used for commercial purposes  All illustrations and images included in CareNotes® are the copyrighted property of [employer]  or [facility] Modesto Gutierrez   The above information is an  only  It is not intended as medical advice for individual conditions or treatments  Talk to your doctor, nurse or pharmacist before following any medical regimen to see if it is safe and effective for you

## 2021-05-21 NOTE — ASSESSMENT & PLAN NOTE
60-year-old male with GERD, hyperlipidemia, osteoarthritis status post right total knee replacement, bilateral lower extremity varicosities with significant pain and discomfort, R>L returns for evaluation of worsening symptoms  -Patient was previously evaluated with reflux study which did not show any significant deep or superficial venous incompetency   -Initially symptoms controlled with compression stockings though at this point not alleviating his symptoms   -Throbbing discomfort right shin on a daily basis  -Will evaluate with reflux study now and return to the office with vascular surgeon to review study and discuss treatment options

## 2021-06-09 ENCOUNTER — HOSPITAL ENCOUNTER (OUTPATIENT)
Dept: NON INVASIVE DIAGNOSTICS | Facility: CLINIC | Age: 61
Discharge: HOME | End: 2021-06-09
Payer: COMMERCIAL

## 2021-06-09 DIAGNOSIS — I83.813 VARICOSE VEINS OF BILATERAL LOWER EXTREMITIES WITH PAIN: ICD-10-CM

## 2021-06-09 PROCEDURE — 93970 EXTREMITY STUDY: CPT

## 2021-06-09 PROCEDURE — 93970 EXTREMITY STUDY: CPT | Performed by: SURGERY

## 2021-06-16 ENCOUNTER — OFFICE VISIT (OUTPATIENT)
Dept: VASCULAR SURGERY | Facility: CLINIC | Age: 61
End: 2021-06-16
Payer: COMMERCIAL

## 2021-06-16 ENCOUNTER — TELEPHONE (OUTPATIENT)
Dept: ADMINISTRATIVE | Facility: HOSPITAL | Age: 61
End: 2021-06-16

## 2021-06-16 VITALS
HEART RATE: 78 BPM | SYSTOLIC BLOOD PRESSURE: 124 MMHG | HEIGHT: 67 IN | WEIGHT: 198 LBS | DIASTOLIC BLOOD PRESSURE: 76 MMHG | BODY MASS INDEX: 31.08 KG/M2 | RESPIRATION RATE: 20 BRPM

## 2021-06-16 DIAGNOSIS — I83.813 VARICOSE VEINS OF BILATERAL LOWER EXTREMITIES WITH PAIN: Primary | ICD-10-CM

## 2021-06-16 PROCEDURE — 3008F BODY MASS INDEX DOCD: CPT | Performed by: SURGERY

## 2021-06-16 PROCEDURE — 99214 OFFICE O/P EST MOD 30 MIN: CPT | Performed by: SURGERY

## 2021-06-16 RX ORDER — CHLORHEXIDINE GLUCONATE 0.12 MG/ML
15 RINSE ORAL ONCE
Status: CANCELLED | OUTPATIENT
Start: 2021-06-16 | End: 2021-06-16

## 2021-06-16 RX ORDER — CEFAZOLIN SODIUM 2 G/50ML
2000 SOLUTION INTRAVENOUS ONCE
Status: CANCELLED | OUTPATIENT
Start: 2021-06-16 | End: 2021-06-16

## 2021-06-16 NOTE — ASSESSMENT & PLAN NOTE
Bilateral lower extremity varicose veins with pain and swelling  Using compression daily but has to stand for several hours at work on concrete floor  Daily pain with swollen veins in the calf  He would like to get the veins removed  Reviewed doppler, there is reflux in right GSV at inguinal area but corrects in the thigh  There is no reflux in deep system  Thus is I do not recommend laser EVLT of the right GSV  Left GSV and deep veins have no reflux  As the varicosities are symptomatic I recommend consideration for stab phlebectomy  Risks of bruising, recurrence, pain were discussed  EVLT Operative Scheduling Information:    Hospital:  [facility]    Physician:  Patience Lopez    Surgery: b/l stab phlebectomy    Urgency:  Standard    Level:  Level 4: Outpatients to be scheduled for screening procedures and elective surgery that can be delayed for longer than one month without reasonable expectation of detriment to patient  Case Length:  Normal    Post-op Bed:  Outpatient    OR Table:  Standard    Equipment Needs:  None    Medication Instructions:  Aspirin:   Continue (do not hold)    Hydration:  No    Venous Clinical Severity Scores (VCSS)  Item Absent   (0 points) Mild   (1 point) Moderate   (2 points) Severe   (3 points)   Pain [] None [] Occasional [] Daily [x] Daily limiting   Varicose veins [] None [] Few [] Calf or thigh [x] Calf and thigh   Venous edema [] None [] Foot and ankle [x] Above ankle, below knee [] To knee of above   Skin pigmentation [x] None [] Perimalleolar [] Diffuse, lower 1/3 calf [] Wider, above lower 1/3 calf   Inflammation [x] None [] Perimalleolar [] Diffuse, lower 1/3 calf [] Wider, above lower 1/3 calf   Induration [x] None [] Perimalleolar [] Diffuse, lower 1/3 calf [] Wider, above lower 1/3 calf   No  active ulcers [x] None [] 1 [] 2 [] ? 3   Active ulcer size [x] None [] <2 cm [] 2 - 6 cm [] >6 cm   Ulcer duration [x] None [] <3 months [] 3 - 12 months [] >1 year Compression therapy [] None [] Intermittent [] Most days [x] Fully comply   Total 11          CEAP Clinical Classification  [x] Symptomatic   [] Asymptomatic     [] Class 0 No visible or palpable signs of venous disease   [] Class 1 Telangiectasies or reticular veins   [] Class 2 Varicose veins; distinguished from reticular veins by a diameter of 3mm or more   [x] Class 3 Edema   [] Class 4 Changes in skin and subcutaneous tissue secondary to CVD    [] Class 4a Pigmentation or eczema   [] Class 4b Lipodermatosclerosis or atrophie marleny   [] Class 5 Healed venous ulcer   [] Class 6 Active venous ulcer

## 2021-06-16 NOTE — TELEPHONE ENCOUNTER
REMINDER: Under Reason For Call, comments MUST be formatted as:   (Surgeon's Initials) / (Procedure)    Physician / Alexander Colon: Marco A Rod (NPI: [id number]) Melissa Dahlley (Tax: [id number] / NPI: [id number])    Procedure: Stab Phlebectomies of the bilateral lower extremity     Level: 4 - Route clearance(s) to [facility] Surgery Coordinator Pool    Equipment / Rep Needs: No    Assistant Surgeon: No    Allergies: Patient has no known allergies  Instructions Given: NO Bowel Prep General Instructions     Blood Thinners / Medication Hold: Do not hold Aspirin (ASA)   Hydration Required: Patient does not require hydration  Dialysis: Patient is not on dialysis  Consent: I certify that patient has signed, printed, timed, and dated their surgery consent  I certify that BOTH sides of the completed surgery consent have been scanned into the patient's Epic chart by myself on 6/16/2021  Yes, I have LABELED the consent in Epic as Consent for Vascular Procedure  Clearances     Levels   1-3 ROUTE this encounter to [facility] Clearance Pool   AND   SEND Clearance Form(s) to Vascular Nursing e-mail group   Level   4 ROUTE this encounter to [facility] Surgery Coordinator Pool  AND   SEND Clearance Form(s) to Vascular Surgery Schedulers e-mail group     Patient does not require any pre operative clearance  Yes, I have ROUTED this encounter to [facility] Surgery Coordinator and/or [facility] Clearance Pool

## 2021-06-16 NOTE — PROGRESS NOTES
Assessment/Plan:    Varicose veins of bilateral lower extremities with pain  Bilateral lower extremity varicose veins with pain and swelling  Using compression daily but has to stand for several hours at work on concrete floor  Daily pain with swollen veins in the calf  He would like to get the veins removed  Reviewed doppler, there is reflux in right GSV at inguinal area but corrects in the thigh  There is no reflux in deep system  Thus is I do not recommend laser EVLT of the right GSV  Left GSV and deep veins have no reflux  As the varicosities are symptomatic I recommend consideration for stab phlebectomy  Risks of bruising, recurrence, pain were discussed  EVLT Operative Scheduling Information:    Hospital:  [facility]    Physician:  Etta Roy    Surgery: b/l stab phlebectomy    Urgency:  Standard    Level:  Level 4: Outpatients to be scheduled for screening procedures and elective surgery that can be delayed for longer than one month without reasonable expectation of detriment to patient  Case Length:  Normal    Post-op Bed:  Outpatient    OR Table:  Standard    Equipment Needs:  None    Medication Instructions:  Aspirin:   Continue (do not hold)    Hydration:  No    Venous Clinical Severity Scores (VCSS)  Item Absent   (0 points) Mild   (1 point) Moderate   (2 points) Severe   (3 points)   Pain [] None [] Occasional [] Daily [x] Daily limiting   Varicose veins [] None [] Few [] Calf or thigh [x] Calf and thigh   Venous edema [] None [] Foot and ankle [x] Above ankle, below knee [] To knee of above   Skin pigmentation [x] None [] Perimalleolar [] Diffuse, lower 1/3 calf [] Wider, above lower 1/3 calf   Inflammation [x] None [] Perimalleolar [] Diffuse, lower 1/3 calf [] Wider, above lower 1/3 calf   Induration [x] None [] Perimalleolar [] Diffuse, lower 1/3 calf [] Wider, above lower 1/3 calf   No  active ulcers [x] None [] 1 [] 2 [] ? 3   Active ulcer size [x] None [] <2 cm [] 2 - 6 cm [] >6 cm Ulcer duration [x] None [] <3 months [] 3 - 12 months [] >1 year   Compression therapy [] None [] Intermittent [] Most days [x] Fully comply   Total 11          CEAP Clinical Classification  [x] Symptomatic   [] Asymptomatic     [] Class 0 No visible or palpable signs of venous disease   [] Class 1 Telangiectasies or reticular veins   [] Class 2 Varicose veins; distinguished from reticular veins by a diameter of 3mm or more   [x] Class 3 Edema   [] Class 4 Changes in skin and subcutaneous tissue secondary to CVD    [] Class 4a Pigmentation or eczema   [] Class 4b Lipodermatosclerosis or atrophie marleny   [] Class 5 Healed venous ulcer   [] Class 6 Active venous ulcer                Diagnoses and all orders for this visit:    Varicose veins of bilateral lower extremities with pain  -     Case request operating room: MULTIPLE STAB PHLEBECTOMIES; Standing  -     Basic metabolic panel; Future  -     CBC and Platelet; Future  -     Protime-INR; Future  -     HEMOGLOBIN A1C W/ EAG ESTIMATION; Future  -     EKG 12 lead; Future  -     Case request operating room: MULTIPLE STAB PHLEBECTOMIES    Other orders  -     Diet NPO; Sips with meds; Standing  -     Void on call to OR; Standing  -     Insert peripheral IV; Standing  -     chlorhexidine (PERIDEX) 0 12 % oral rinse 15 mL  -     Shower/scrub; Standing  -     Place sequential compression device; Standing  -     ceFAZolin (ANCEF) IVPB (premix in dextrose) 2,000 mg 50 mL          Subjective:      Patient ID: Rissa Atkinson is a 62 y o  male  Patient had a LEVDR on 6/9/21  Pt c/o painful veins and swelling in the legs  Rt leg > Lt leg  Pt stopped smoking 3 moths ago  HPI  Ongoing varicose veins for 4-5 years  Long standing work on concrete floors, standing for long periods  Using compression stockings for about 1year regular use  Compression stockings help, but varicose veins are still progressing  Got worse about 5 months ago after his right knee surgery    He is now getting slight swelling in both legs, painful swollen varicose veins in both legs especially at end of work shift  Burning pain along enlarged varicose veins  The following portions of the patient's history were reviewed and updated as appropriate: allergies, current medications, past family history, past medical history, past social history, past surgical history and problem list     Review of Systems   Constitutional: Negative  HENT: Negative  Eyes: Negative  Respiratory: Negative  Cardiovascular: Positive for leg swelling  Painful veins   Gastrointestinal: Negative  Endocrine: Negative  Genitourinary: Negative  Musculoskeletal: Negative  Leg pain   Skin: Negative  Allergic/Immunologic: Negative  Neurological: Negative  Hematological: Negative  Psychiatric/Behavioral: Negative  I have reviewed the ROS as entered and made changes as necessary  Objective:      /76 (BP Location: Left arm, Patient Position: Sitting)   Pulse 78   Resp 20   Ht 5' 7" (1 702 m)   Wt 89 8 kg (198 lb)   BMI 31 01 kg/m²          Physical Exam  Vitals reviewed  Constitutional:       Appearance: Normal appearance  HENT:      Head: Normocephalic and atraumatic  Cardiovascular:      Rate and Rhythm: Normal rate and regular rhythm  Pulses: Normal pulses  Heart sounds: No murmur heard  No friction rub  No gallop  Pulmonary:      Effort: Pulmonary effort is normal  No respiratory distress  Breath sounds: Normal breath sounds  No stridor  No wheezing, rhonchi or rales  Abdominal:      Palpations: Abdomen is soft  Musculoskeletal:      Right lower leg: Edema (trace) present  Left lower leg: Edema (trace) present  Skin:     General: Skin is warm and dry  Capillary Refill: Capillary refill takes less than 2 seconds  Comments: B/l varicose veins, large scattered  Neurological:      General: No focal deficit present  Mental Status: He is alert and oriented to person, place, and time  Cranial Nerves: No cranial nerve deficit  Motor: No weakness     Psychiatric:         Mood and Affect: Mood normal          Behavior: Behavior normal

## 2021-06-24 ENCOUNTER — PREP FOR PROCEDURE (OUTPATIENT)
Dept: VASCULAR SURGERY | Facility: CLINIC | Age: 61
End: 2021-06-24

## 2021-06-24 NOTE — TELEPHONE ENCOUNTER
Is patient requesting a call when authorization has been obtained? Patient did not request a call  Surgery Date: 8/13/21  Primary Surgeon: Kelly Lopez (NPI: [id number])  Assisting Surgeon: Not Applicable (N/A)  Facility: [facility] (Tax: [id number] / NPI: [id number])  Inpatient / Outpatient: Outpatient  Level: 4    Clearance Received: No clearance ordered  Consent Received: Yes, scanned into Epic on 6/16/21  Medication Hold / Last Dose: Not Applicable (N/A)  VQI Spreadsheet: Not Applicable (N/A)  IR Notified: Not Applicable (N/A)  Rep  Notified: Not Applicable (N/A)  Equipment Needs: Not Applicable (N/A)  Vas Lab Requested: Not Applicable (N/A)  Patient Contacted: 6/24/21    Diagnosis: [id number]  Procedure/ CPT Code(s): Stab Phlebectomies of the bilateral lower extremity /// CPT: 45550    For varicose vein related procedures, last LEVDR? Yes, patient's LEVDR was completed within 6-months of their procedure date  Post Operative Date/ Time: 8/16/21 , 9:[facility]am [facility] with Amalia Blanchard (NPI: [id number])        S/w pt and scheduled him for his B/L stab phlebs with Dr Guzman Benedict  He will have his blood work and ekg done 1-2 weeks prior at [facility]

## 2021-07-30 ENCOUNTER — ANESTHESIA EVENT (OUTPATIENT)
Dept: PERIOP | Facility: AMBULARY SURGERY CENTER | Age: 61
End: 2021-07-30
Payer: COMMERCIAL

## 2021-08-02 NOTE — TELEPHONE ENCOUNTER
Authorization requirements reviewed  Please refer to Maria Guadalupe Brady / Avis Young number [id number] for case updates

## 2021-08-05 ENCOUNTER — OFFICE VISIT (OUTPATIENT)
Dept: LAB | Facility: CLINIC | Age: 61
End: 2021-08-05
Payer: COMMERCIAL

## 2021-08-05 ENCOUNTER — APPOINTMENT (OUTPATIENT)
Dept: LAB | Facility: CLINIC | Age: 61
End: 2021-08-05
Payer: COMMERCIAL

## 2021-08-05 DIAGNOSIS — I83.813 VARICOSE VEINS OF BILATERAL LOWER EXTREMITIES WITH PAIN: ICD-10-CM

## 2021-08-05 LAB
ANION GAP SERPL CALCULATED.3IONS-SCNC: 6 MMOL/L (ref 4–13)
ATRIAL RATE: 75 BPM
BUN SERPL-MCNC: 21 MG/DL (ref 5–25)
CALCIUM SERPL-MCNC: 9 MG/DL (ref 8.3–10.1)
CHLORIDE SERPL-SCNC: 103 MMOL/L (ref 100–108)
CO2 SERPL-SCNC: 28 MMOL/L (ref 21–32)
CREAT SERPL-MCNC: 1.07 MG/DL (ref 0.6–1.3)
ERYTHROCYTE [DISTWIDTH] IN BLOOD BY AUTOMATED COUNT: 12.5 % (ref 11.6–15.1)
EST. AVERAGE GLUCOSE BLD GHB EST-MCNC: 111 MG/DL
GFR SERPL CREATININE-BSD FRML MDRD: 76 ML/MIN/1.73SQ M
GLUCOSE P FAST SERPL-MCNC: 102 MG/DL (ref 65–99)
HBA1C MFR BLD: 5.5 %
HCT VFR BLD AUTO: 47.9 % (ref 36.5–49.3)
HGB BLD-MCNC: 15.8 G/DL (ref 12–17)
INR PPP: 0.91 (ref 0.84–1.19)
MCH RBC QN AUTO: 30.9 PG (ref 26.8–34.3)
MCHC RBC AUTO-ENTMCNC: 33 G/DL (ref 31.4–37.4)
MCV RBC AUTO: 94 FL (ref 82–98)
P AXIS: 54 DEGREES
PLATELET # BLD AUTO: 323 THOUSANDS/UL (ref 149–390)
PMV BLD AUTO: 10.1 FL (ref 8.9–12.7)
POTASSIUM SERPL-SCNC: 4 MMOL/L (ref 3.5–5.3)
PR INTERVAL: 162 MS
PROTHROMBIN TIME: 12.4 SECONDS (ref 11.6–14.5)
QRS AXIS: 24 DEGREES
QRSD INTERVAL: 80 MS
QT INTERVAL: 386 MS
QTC INTERVAL: 431 MS
RBC # BLD AUTO: 5.12 MILLION/UL (ref 3.88–5.62)
SODIUM SERPL-SCNC: 137 MMOL/L (ref 136–145)
T WAVE AXIS: 52 DEGREES
VENTRICULAR RATE: 75 BPM
WBC # BLD AUTO: 9.53 THOUSAND/UL (ref 4.31–10.16)

## 2021-08-05 PROCEDURE — 85027 COMPLETE CBC AUTOMATED: CPT

## 2021-08-05 PROCEDURE — 36415 COLL VENOUS BLD VENIPUNCTURE: CPT

## 2021-08-05 PROCEDURE — 93005 ELECTROCARDIOGRAM TRACING: CPT

## 2021-08-05 PROCEDURE — 83036 HEMOGLOBIN GLYCOSYLATED A1C: CPT

## 2021-08-05 PROCEDURE — 80048 BASIC METABOLIC PNL TOTAL CA: CPT

## 2021-08-05 PROCEDURE — 85610 PROTHROMBIN TIME: CPT

## 2021-08-05 PROCEDURE — 93010 ELECTROCARDIOGRAM REPORT: CPT | Performed by: INTERNAL MEDICINE

## 2021-08-11 DIAGNOSIS — E78.00 HYPERCHOLESTEROLEMIA: ICD-10-CM

## 2021-08-11 RX ORDER — ATORVASTATIN CALCIUM 40 MG/1
TABLET, FILM COATED ORAL
Qty: 90 TABLET | Refills: 1 | Status: SHIPPED | OUTPATIENT
Start: 2021-08-11 | End: 2022-02-14

## 2021-08-12 NOTE — TELEPHONE ENCOUNTER
Patient called looking for his time for surgery tomorrow I told him that the hospital will call today between 3-7 patient will be at work at this time so please leave a message   LLF

## 2021-08-13 ENCOUNTER — HOSPITAL ENCOUNTER (OUTPATIENT)
Facility: AMBULARY SURGERY CENTER | Age: 61
Setting detail: OUTPATIENT SURGERY
Discharge: HOME | End: 2021-08-13
Attending: SURGERY | Admitting: SURGERY
Payer: COMMERCIAL

## 2021-08-13 ENCOUNTER — ANESTHESIA (OUTPATIENT)
Dept: PERIOP | Facility: AMBULARY SURGERY CENTER | Age: 61
End: 2021-08-13
Payer: COMMERCIAL

## 2021-08-13 VITALS
RESPIRATION RATE: 16 BRPM | WEIGHT: 199 LBS | HEART RATE: 89 BPM | TEMPERATURE: 98.6 F | SYSTOLIC BLOOD PRESSURE: 138 MMHG | BODY MASS INDEX: 31.17 KG/M2 | OXYGEN SATURATION: 98 % | DIASTOLIC BLOOD PRESSURE: 79 MMHG

## 2021-08-13 DIAGNOSIS — I83.813 VARICOSE VEINS OF BILATERAL LOWER EXTREMITIES WITH PAIN: Primary | ICD-10-CM

## 2021-08-13 PROCEDURE — 99024 POSTOP FOLLOW-UP VISIT: CPT | Performed by: SURGERY

## 2021-08-13 PROCEDURE — 37765 STAB PHLEB VEINS XTR 10-20: CPT | Performed by: SURGERY

## 2021-08-13 RX ORDER — FENTANYL CITRATE 50 UG/ML
INJECTION, SOLUTION INTRAMUSCULAR; INTRAVENOUS AS NEEDED
Status: DISCONTINUED | OUTPATIENT
Start: 2021-08-13 | End: 2021-08-13

## 2021-08-13 RX ORDER — MAGNESIUM HYDROXIDE 1200 MG/15ML
LIQUID ORAL AS NEEDED
Status: DISCONTINUED | OUTPATIENT
Start: 2021-08-13 | End: 2021-08-13 | Stop reason: HOSPADM

## 2021-08-13 RX ORDER — LIDOCAINE HYDROCHLORIDE 10 MG/ML
0.5 INJECTION, SOLUTION EPIDURAL; INFILTRATION; INTRACAUDAL; PERINEURAL ONCE AS NEEDED
Status: DISCONTINUED | OUTPATIENT
Start: 2021-08-13 | End: 2021-08-13 | Stop reason: HOSPADM

## 2021-08-13 RX ORDER — FENTANYL CITRATE/PF 50 MCG/ML
25 SYRINGE (ML) INJECTION
Status: COMPLETED | OUTPATIENT
Start: 2021-08-13 | End: 2021-08-13

## 2021-08-13 RX ORDER — ONDANSETRON 2 MG/ML
INJECTION INTRAMUSCULAR; INTRAVENOUS AS NEEDED
Status: DISCONTINUED | OUTPATIENT
Start: 2021-08-13 | End: 2021-08-13

## 2021-08-13 RX ORDER — PROPOFOL 10 MG/ML
INJECTION, EMULSION INTRAVENOUS AS NEEDED
Status: DISCONTINUED | OUTPATIENT
Start: 2021-08-13 | End: 2021-08-13

## 2021-08-13 RX ORDER — HYDROCODONE BITARTRATE AND ACETAMINOPHEN 5; 325 MG/1; MG/1
1 TABLET ORAL EVERY 6 HOURS PRN
Qty: 10 TABLET | Refills: 0 | Status: SHIPPED | OUTPATIENT
Start: 2021-08-13 | End: 2021-08-23

## 2021-08-13 RX ORDER — MIDAZOLAM HYDROCHLORIDE 2 MG/2ML
INJECTION, SOLUTION INTRAMUSCULAR; INTRAVENOUS AS NEEDED
Status: DISCONTINUED | OUTPATIENT
Start: 2021-08-13 | End: 2021-08-13

## 2021-08-13 RX ORDER — HYDROCODONE BITARTRATE AND ACETAMINOPHEN 5; 325 MG/1; MG/1
1 TABLET ORAL EVERY 6 HOURS PRN
Status: DISCONTINUED | OUTPATIENT
Start: 2021-08-13 | End: 2021-08-13 | Stop reason: HOSPADM

## 2021-08-13 RX ORDER — CHLORHEXIDINE GLUCONATE 0.12 MG/ML
15 RINSE ORAL ONCE
Status: DISCONTINUED | OUTPATIENT
Start: 2021-08-13 | End: 2021-08-13 | Stop reason: HOSPADM

## 2021-08-13 RX ORDER — ONDANSETRON 2 MG/ML
4 INJECTION INTRAMUSCULAR; INTRAVENOUS ONCE AS NEEDED
Status: DISCONTINUED | OUTPATIENT
Start: 2021-08-13 | End: 2021-08-13 | Stop reason: HOSPADM

## 2021-08-13 RX ORDER — KETOROLAC TROMETHAMINE 30 MG/ML
INJECTION, SOLUTION INTRAMUSCULAR; INTRAVENOUS AS NEEDED
Status: DISCONTINUED | OUTPATIENT
Start: 2021-08-13 | End: 2021-08-13

## 2021-08-13 RX ORDER — SODIUM CHLORIDE, SODIUM LACTATE, POTASSIUM CHLORIDE, CALCIUM CHLORIDE 600; 310; 30; 20 MG/100ML; MG/100ML; MG/100ML; MG/100ML
125 INJECTION, SOLUTION INTRAVENOUS CONTINUOUS
Status: DISCONTINUED | OUTPATIENT
Start: 2021-08-13 | End: 2021-08-13 | Stop reason: HOSPADM

## 2021-08-13 RX ORDER — CEFAZOLIN SODIUM 2 G/50ML
2000 SOLUTION INTRAVENOUS ONCE
Status: COMPLETED | OUTPATIENT
Start: 2021-08-13 | End: 2021-08-13

## 2021-08-13 RX ORDER — DEXAMETHASONE SODIUM PHOSPHATE 10 MG/ML
INJECTION, SOLUTION INTRAMUSCULAR; INTRAVENOUS AS NEEDED
Status: DISCONTINUED | OUTPATIENT
Start: 2021-08-13 | End: 2021-08-13

## 2021-08-13 RX ORDER — LIDOCAINE HYDROCHLORIDE 20 MG/ML
INJECTION, SOLUTION EPIDURAL; INFILTRATION; INTRACAUDAL; PERINEURAL AS NEEDED
Status: DISCONTINUED | OUTPATIENT
Start: 2021-08-13 | End: 2021-08-13

## 2021-08-13 RX ADMIN — FENTANYL CITRATE 25 MCG: 50 INJECTION INTRAMUSCULAR; INTRAVENOUS at 13:37

## 2021-08-13 RX ADMIN — ONDANSETRON 4 MG: 2 INJECTION INTRAMUSCULAR; INTRAVENOUS at 12:10

## 2021-08-13 RX ADMIN — PROPOFOL 200 MG: 10 INJECTION, EMULSION INTRAVENOUS at 12:10

## 2021-08-13 RX ADMIN — SODIUM CHLORIDE, SODIUM LACTATE, POTASSIUM CHLORIDE, AND CALCIUM CHLORIDE: .6; .31; .03; .02 INJECTION, SOLUTION INTRAVENOUS at 11:59

## 2021-08-13 RX ADMIN — FENTANYL CITRATE 25 MCG: 50 INJECTION INTRAMUSCULAR; INTRAVENOUS at 13:48

## 2021-08-13 RX ADMIN — KETOROLAC TROMETHAMINE 30 MG: 30 INJECTION, SOLUTION INTRAMUSCULAR at 12:59

## 2021-08-13 RX ADMIN — FENTANYL CITRATE 25 MCG: 50 INJECTION, SOLUTION INTRAMUSCULAR; INTRAVENOUS at 12:31

## 2021-08-13 RX ADMIN — PROPOFOL 30 MG: 10 INJECTION, EMULSION INTRAVENOUS at 12:32

## 2021-08-13 RX ADMIN — FENTANYL CITRATE 25 MCG: 50 INJECTION, SOLUTION INTRAMUSCULAR; INTRAVENOUS at 12:29

## 2021-08-13 RX ADMIN — MIDAZOLAM HYDROCHLORIDE 2 MG: 1 INJECTION, SOLUTION INTRAMUSCULAR; INTRAVENOUS at 12:05

## 2021-08-13 RX ADMIN — FENTANYL CITRATE 25 MCG: 50 INJECTION INTRAMUSCULAR; INTRAVENOUS at 13:53

## 2021-08-13 RX ADMIN — PROPOFOL 50 MG: 10 INJECTION, EMULSION INTRAVENOUS at 12:15

## 2021-08-13 RX ADMIN — FENTANYL CITRATE 50 MCG: 50 INJECTION, SOLUTION INTRAMUSCULAR; INTRAVENOUS at 12:13

## 2021-08-13 RX ADMIN — DEXAMETHASONE SODIUM PHOSPHATE 4 MG: 10 INJECTION, SOLUTION INTRAMUSCULAR; INTRAVENOUS at 12:10

## 2021-08-13 RX ADMIN — LIDOCAINE HYDROCHLORIDE 100 MG: 20 INJECTION, SOLUTION EPIDURAL; INFILTRATION; INTRACAUDAL at 12:10

## 2021-08-13 RX ADMIN — CEFAZOLIN SODIUM 2000 MG: 2 SOLUTION INTRAVENOUS at 12:04

## 2021-08-13 RX ADMIN — FENTANYL CITRATE 25 MCG: 50 INJECTION INTRAMUSCULAR; INTRAVENOUS at 13:42

## 2021-08-13 NOTE — H&P
Varicose veins of bilateral lower extremities with pain  Bilateral lower extremity varicose veins with pain and swelling  Using compression daily but has to stand for several hours at work on concrete floor  Daily pain with swollen veins in the calf  He would like to get the veins removed      Reviewed doppler, there is reflux in right GSV at inguinal area but corrects in the thigh  There is no reflux in deep system  Thus is I do not recommend laser EVLT of the right GSV  Left GSV and deep veins have no reflux      As the varicosities are symptomatic I recommend consideration for stab phlebectomy  Risks of bruising, recurrence, pain were discussed       EVLT Operative Scheduling Information:     Hospital:  [address] [facility]     Physician:  Mauricio Najjar     Surgery: b/l stab phlebectomy     Urgency:  Standard     Level:  Level 4: Outpatients to be scheduled for screening procedures and elective surgery that can be delayed for longer than one month without reasonable expectation of detriment to patient      Case Length:  Normal     Post-op Bed:  Outpatient     OR Table:  Standard     Equipment Needs:  None     Medication Instructions:  Aspirin:   Continue (do not hold)     Hydration:  No     Venous Clinical Severity Scores (VCSS)  Item Absent   (0 points) Mild   (1 point) Moderate   (2 points) Severe   (3 points)   Pain []? None []? Occasional []? Daily [x]? Daily limiting   Varicose veins []? None []? Few []? Calf or thigh [x]? Calf and thigh   Venous edema []? None []? Foot and ankle [x]? Above ankle, below knee []? To knee of above   Skin pigmentation [x]? None []? Perimalleolar []? Diffuse, lower 1/3 calf []? Wider, above lower 1/3 calf   Inflammation [x]? None []? Perimalleolar []? Diffuse, lower 1/3 calf []? Wider, above lower 1/3 calf   Induration [x]? None []? Perimalleolar []? Diffuse, lower 1/3 calf []? Wider, above lower 1/3 calf   No  active ulcers [x]? None []? 1 []? 2 []? ? 3   Active ulcer size [x]? None []? <2 cm []? 2 - 6 cm []? >6 cm   Ulcer duration [x]? None []? <3 months []? 3 - 12 months []? >1 year   Compression therapy []? None []? Intermittent []? Most days [x]? Fully comply   Total 11               CEAP Clinical Classification  [x]? Symptomatic   []? Asymptomatic      []? Class 0 No visible or palpable signs of venous disease   []? Class 1 Telangiectasies or reticular veins   []? Class 2 Varicose veins; distinguished from reticular veins by a diameter of 3mm or more   [x]? Class 3 Edema   []? Class 4 Changes in skin and subcutaneous tissue secondary to CVD    []? Class 4a Pigmentation or eczema   []? Class 4b Lipodermatosclerosis or atrophie marleny   []? Class 5 Healed venous ulcer   []? Class 6 Active venous ulcer                      Diagnoses and all orders for this visit:     Varicose veins of bilateral lower extremities with pain  -     Case request operating room: MULTIPLE STAB PHLEBECTOMIES; Standing  -     Basic metabolic panel; Future  -     CBC and Platelet; Future  -     Protime-INR; Future  -     HEMOGLOBIN A1C W/ EAG ESTIMATION; Future  -     EKG 12 lead; Future  -     Case request operating room: MULTIPLE STAB PHLEBECTOMIES     Other orders  -     Diet NPO; Sips with meds; Standing  -     Void on call to OR; Standing  -     Insert peripheral IV; Standing  -     chlorhexidine (PERIDEX) 0 12 % oral rinse 15 mL  -     Shower/scrub; Standing  -     Place sequential compression device; Standing  -     ceFAZolin (ANCEF) IVPB (premix in dextrose) 2,000 mg 50 mL            Subjective:       Patient ID: Jeffery Padilla is a 62 y o  male      Patient had a LEVDR on 6/9/21  Pt c/o painful veins and swelling in the legs  Rt leg > Lt leg  Pt stopped smoking 3 moths ago      HPI  Ongoing varicose veins for 4-5 years  Long standing work on concrete floors, standing for long periods  Using compression stockings for about 1year regular use   Compression stockings help, but varicose veins are still progressing  Got worse about 5 months ago after his right knee surgery  He is now getting slight swelling in both legs, painful swollen varicose veins in both legs especially at end of work shift  Burning pain along enlarged varicose veins      The following portions of the patient's history were reviewed and updated as appropriate: allergies, current medications, past family history, past medical history, past social history, past surgical history and problem list      Review of Systems   Constitutional: Negative  HENT: Negative  Eyes: Negative  Respiratory: Negative  Cardiovascular: Positive for leg swelling  Painful veins   Gastrointestinal: Negative  Endocrine: Negative  Genitourinary: Negative  Musculoskeletal: Negative  Leg pain   Skin: Negative  Allergic/Immunologic: Negative  Neurological: Negative  Hematological: Negative  Psychiatric/Behavioral: Negative  I have reviewed the ROS as entered and made changes as necessary         Objective:     /90   Pulse 80   Temp (!) 97 2 °F (36 2 °C) (Temporal)   Resp 18   Wt 90 3 kg (199 lb)   SpO2 98%   BMI 31 17 kg/m²               Physical Exam  Vitals reviewed  Constitutional:       Appearance: Normal appearance  HENT:      Head: Normocephalic and atraumatic  Cardiovascular:      Rate and Rhythm: Normal rate and regular rhythm  Pulses: Normal pulses  Heart sounds: No murmur heard  No friction rub  No gallop  Pulmonary:      Effort: Pulmonary effort is normal  No respiratory distress  Breath sounds: Normal breath sounds  No stridor  No wheezing, rhonchi or rales  Abdominal:      Palpations: Abdomen is soft  Musculoskeletal:      Right lower leg: Edema (trace) present  Left lower leg: Edema (trace) present  Skin:     General: Skin is warm and dry  Capillary Refill: Capillary refill takes less than 2 seconds  Comments: B/l varicose veins, large scattered  Neurological:      General: No focal deficit present  Mental Status: He is alert and oriented to person, place, and time  Cranial Nerves: No cranial nerve deficit  Motor: No weakness     Psychiatric:         Mood and Affect: Mood normal          Behavior: Behavior normal

## 2021-08-13 NOTE — ANESTHESIA POSTPROCEDURE EVALUATION
Post-Op Assessment Note    CV Status:  Stable  Pain Score: 0    Pain management: adequate     Mental Status:  Alert and awake   Hydration Status:  Euvolemic   PONV Controlled:  Controlled   Airway Patency:  Patent      Post Op Vitals Reviewed: Yes      Staff: CRNA         No complications documented      BP   132/85   Temp     Pulse 79   Resp 15   SpO2 99

## 2021-08-13 NOTE — ANESTHESIA PREPROCEDURE EVALUATION
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   (+) Schatzki's ring of distal esophagus      Lab Results   Component Value Date    WBC 9 53 08/05/2021    HGB 15 8 08/05/2021    HCT 47 9 08/05/2021    MCV 94 08/05/2021     08/05/2021     Lab Results   Component Value Date    K 4 0 08/05/2021    CO2 28 08/05/2021     08/05/2021    BUN 21 08/05/2021    CREATININE 1 07 08/05/2021     Lab Results   Component Value Date    INR 0 91 08/05/2021    PROTIME 12 4 08/05/2021     Lab Results   Component Value Date    HGBA1C 5 5 08/05/2021       Physical Exam    Airway    Mallampati score: II  TM Distance: >3 FB  Neck ROM: full     Dental       Cardiovascular      Pulmonary      Other Findings        Anesthesia Plan  ASA Score- 2     Anesthesia Type- general with ASA Monitors  Additional Monitors:   Airway Plan: LMA  Plan Factors-Exercise tolerance (METS): >4 METS  Chart reviewed  Existing labs reviewed  Patient summary reviewed  Induction- intravenous  Postoperative Plan- Plan for postoperative opioid use  Planned trial extubation    Informed Consent- Anesthetic plan and risks discussed with patient  I personally reviewed this patient with the CRNA  Discussed and agreed on the Anesthesia Plan with the CRNA  Rosalind Adorno

## 2021-08-13 NOTE — DISCHARGE INSTRUCTIONS
DISCHARGE INSTRUCTIONS   VARICOSE VEIN SURGERY    1) When released from the hospital, you should have a compression bandage in place on the operated leg  This bandage should feel snug but not too tight  If the bandage becomes blood soaked or painfully tight, elevate your leg and call your surgeon immediately  2) If the operated leg becomes increasingly painful or swollen, or if there is increasing redness or pain around your incisions, contact our office  3) On the day of your operation, take it easy and elevate your leg as much as possible  You can take short walks around the house  When sitting, the leg should be elevated  The preferred position is to have the leg at or above the level of the heart  Starting on the first day after surgery, light walking is strongly encouraged as tolerated  After your ultrasound test, you can resume your normal activity, but no heavy lifting or strenuous exercise for 2 weeks  4) Some bruising and redness of the skin is common after varicose vein surgery  This can be lessened by strict elevation of the leg  Many patients will notice some numbness of the shin, ankle, calf, or the top of the foot  This usually fades with time, but may be persistent  After surgery you can expect bruising, swelling and hard knots on your leg  As your body heals the bruising will fade and the swelling and knots will subside  5) Keep your operative dressings on for till your scheduled followup  However if the bandages feel too tight before the office visit then  you can remove all bandages  Your incisions are covered with a surgical glue which will wear away in 1-2 weeks  Start wearing your compression stockings after the bandage is removed  You can use the ACE wraps instead if your leg is too swollen for the compression stocking  Observe incisions daily  Report to our office any of the following:  a) Any areas that are red and angry in appearance    b) Any drainage that is milky or cloudy in appearance or that has a foul odor  c) Elevated temperature of 100 5 degrees F or greater  6) Apply sunscreen with SPF 30 to incisions while sun bathing for up to one year after surgery to reduce the chances of your incisions darkening  7)        Your first post-operative appointment will be 2 to 3 days after your surgery  At this appointment, you will have an ultrasound  You will follow-up with                    your surgeon ~2 weeks after your procedure  8)         If have any questions, please call our office at [phone] [address]  [phone] [facility] FREE [phone]  [address] [place] [address] , [address] [facility], [address]  [address], [address], [address]  [address], [facility], [address]  [address] [facility], [address]  [address], [address]  [address], [address] [facility], [address] [facility] [address]   [address] [facility], [address] [facility], [address]  [address] [facility], [address] [facility], [facility], [address], [address]

## 2021-08-13 NOTE — OP NOTE
OPERATIVE REPORT  PATIENT NAME: Rachna Ohara    :  1960  MRN: [id number]  Pt Location: [facility]    SURGERY DATE: 2021    Surgeon(s) and Role:     Rhett Bhatti MD - Primary    Preop Diagnosis:  Varicose veins of bilateral lower extremities with pain [I83 813]    Post-Op Diagnosis Codes: * Varicose veins of bilateral lower extremities with pain [I83 813]    Procedure(s) (LRB):  MULTIPLE STAB PHLEBECTOMIES, 18 RIGHT, 11 LEFT (Bilateral)    Specimen(s):  * No specimens in log *    Estimated Blood Loss:   50 mL    Drains:  * No LDAs found *    Anesthesia Type:   General    Operative Indications:  Varicose veins of bilateral lower extremities with pain [I83 813]      Operative Findings:  Multiple varicosities excised    Complications:   None    Procedure and Technique: In the preoperative holding area the patient was examined in standing position and superficial varicosities were marked  Patient was brought to the operating room placed in the supine position and general anesthesia was induced via endotracheal intubation  Intravenous antibiotic prophylaxis was administered  Ultrasound was performed and the greater saphenous vein was identified and marked in the medial thigh  Patient's entire    bilateral leg was then prepped and draped in the usual standard sterile fashion  Multiple stab incisions (total 18 on right leg and 11 on left leg) were made using 11 blade over the previously marked varicosities  Using mosquito forceps and  Stab phlebectomy hooks we removed these varicosities  Manual pressure was held to achieve hemostasis over the stab incisions  Exofin was applied over the stab incisions  The leg was wrapped in a multilayered compression bandage with web roll, Ace wrap  Patient was awakened from general anesthesia and transferred to recovery room in a stable fashion  In the end of the case instrument sponge and needle counts were found to be correct      I was present for the entire procedure  A qualified resident physician was not available     I was present for the entire procedure    Patient Disposition:  PACU     SIGNATURE: Alisa Edmond MD  DATE: August 13, 2021  TIME: 1:15 PM

## 2021-08-16 ENCOUNTER — OFFICE VISIT (OUTPATIENT)
Dept: VASCULAR SURGERY | Facility: CLINIC | Age: 61
End: 2021-08-16

## 2021-08-16 VITALS — HEIGHT: 67 IN | BODY MASS INDEX: 31.23 KG/M2 | WEIGHT: 199 LBS

## 2021-08-16 DIAGNOSIS — I83.813 VARICOSE VEINS OF BILATERAL LOWER EXTREMITIES WITH PAIN: Primary | ICD-10-CM

## 2021-08-16 PROCEDURE — 99024 POSTOP FOLLOW-UP VISIT: CPT | Performed by: NURSE PRACTITIONER

## 2021-08-16 NOTE — ASSESSMENT & PLAN NOTE
60-year-old male with GERD, hyperlipidemia, osteoarthritis status post right total knee replacement, bilateral lower extremity varicosities with significant pain and discomfort, R>L now s/p Bilateral stab phlebectomies, R phlebs x 18 and L phlebs x 11 by Dr Cecile Cade 8/13/21 who presents for post op visit   -Post up dressings removed   -Phlebectomy sites intact with surgical glue      -Advance activity as tolerated   -Resume compression when return to work   -Follow up in the office in 4 weeks to recheck

## 2021-08-16 NOTE — PROGRESS NOTES
Assessment/Plan:    Varicose veins of bilateral lower extremities with pain  69-year-old male with GERD, hyperlipidemia, osteoarthritis status post right total knee replacement, bilateral lower extremity varicosities with significant pain and discomfort, R>L now s/p Bilateral stab phlebectomies, R phlebs x 18 and L phlebs x 11 by Dr Adi Pickett 8/13/21 who presents for post op visit   -Post up dressings removed   -Phlebectomy sites intact with surgical glue  -Advance activity as tolerated   -Resume compression when return to work   -Follow up in the office in 4 weeks to recheck       Diagnoses and all orders for this visit:    Varicose veins of bilateral lower extremities with pain          Subjective:      Patient ID: Lin Plata is a 62 y o  male  Patient presents today for dression removal s/p BL stab phlebs done 8/13/21 by Dr Adi Pickett  Patient takes Atorvastatin  Patient is a current smoker  HPI  Patient presents to the office for postop visit  He is status post bilateral lower extremity stab phlebectomies by Dr Adi Pickett 8/13/21   Postop dressing removed  He denies any significant pain or discomfort  Mild tenderness at right anterior lateral distal leg phlebectomy site  No shortness of breath or chest heaviness  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  The following portions of the patient's history were reviewed and updated as appropriate: allergies, current medications, past family history, past medical history, past social history, past surgical history and problem list   ROS reviewed     Review of Systems   Constitutional: Negative  HENT: Negative  Eyes: Negative  Respiratory: Negative  Cardiovascular: Negative  Gastrointestinal: Negative  Endocrine: Negative  Genitourinary: Negative  Musculoskeletal: Negative  Skin: Negative  Allergic/Immunologic: Negative  Neurological: Negative  Hematological: Negative  Psychiatric/Behavioral: Negative  Objective:  I have reviewed and made appropriate changes to the review of systems input by the medical assistant  Vitals:    21 0915   Weight: 90 3 kg (199 lb)   Height: 5' 7" (1 702 m)       Patient Active Problem List   Diagnosis    Varicose veins of bilateral lower extremities with pain    GERD (gastroesophageal reflux disease)    Hypercholesterolemia    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    Colon polyps    Schatzki's ring of distal esophagus    Osteoarthritis of both knees    COVID-19 virus infection       Past Surgical History:   Procedure Laterality Date    ADENOIDECTOMY      JOINT REPLACEMENT Right     knee    KNEE SURGERY      TONSILLECTOMY         Family History   Problem Relation Age of Onset    Varicose Veins Father        Social History     Socioeconomic History    Marital status: Single     Spouse name: Not on file    Number of children: Not on file    Years of education: Not on file    Highest education level: Not on file   Occupational History    Not on file   Tobacco Use    Smoking status: Current Some Day Smoker     Types: Cigarettes    Smokeless tobacco: Never Used   Vaping Use    Vaping Use: Never used   Substance and Sexual Activity    Alcohol use: Yes    Drug use: Not Currently    Sexual activity: Not on file   Other Topics Concern    Not on file   Social History Narrative    Most recent tobacco use screenin2018     Social Determinants of Health     Financial Resource Strain:     Difficulty of Paying Living Expenses:    Food Insecurity:     Worried About Running Out of Food in the Last Year:     Ran Out of Food in the Last Year:    Transportation Needs:     Lack of Transportation (Medical):      Lack of Transportation (Non-Medical):    Physical Activity:     Days of Exercise per Week:     Minutes of Exercise per Session:    Stress:     Feeling of Stress :    Social Connections:     Frequency of Communication with Friends and Family:  Frequency of Social Gatherings with Friends and Family:     Attends Ep[employer]op[employer] Services:     Active Member of Clubs or Organizations:     Attends Club or Organization Meetings:     Marital Status:    Intimate Partner Violence:     Fear of Current or Ex-Partner:     Emotionally Abused:     Physically Abused:     Sexually Abused:        No Known Allergies      Current Outpatient Medications:     atorvastatin (LIPITOR) 40 mg tablet, TAKE 1 TABLET BY MOUTH DAILY AT BEDTIME, Disp: 90 tablet, Rfl: 1    HYDROcodone-acetaminophen (NORCO) 5-325 mg per tablet, Take 1 tablet by mouth every 6 (six) hours as needed for pain for up to 10 daysMax Daily Amount: 4 tablets, Disp: 10 tablet, Rfl: 0    pantoprazole (PROTONIX) 40 mg tablet, TAKE 1 TABLET BY MOUTH EVERY DAY, Disp: 90 tablet, Rfl: 1      Ht 5' 7" (1 702 m)   Wt 90 3 kg (199 lb)   BMI 31 17 kg/m²          Physical Exam  Vitals and nursing note reviewed  Constitutional:       Appearance: Normal appearance  HENT:      Head: Normocephalic and atraumatic  Eyes:      Extraocular Movements: Extraocular movements intact  Cardiovascular:      Rate and Rhythm: Normal rate and regular rhythm  Heart sounds: Normal heart sounds  Pulmonary:      Effort: Pulmonary effort is normal    Musculoskeletal:         General: No swelling  Skin:     Comments: Bilateral below knee phlebectomy sites intact with surgical glue  No dehiscence or drainage  No erythema  No bleeding   Neurological:      General: No focal deficit present  Mental Status: He is alert and oriented to person, place, and time     Psychiatric:         Mood and Affect: Mood normal          Behavior: Behavior normal

## 2021-09-09 ENCOUNTER — OFFICE VISIT (OUTPATIENT)
Dept: VASCULAR SURGERY | Facility: CLINIC | Age: 61
End: 2021-09-09

## 2021-09-09 VITALS
HEART RATE: 82 BPM | HEIGHT: 67 IN | SYSTOLIC BLOOD PRESSURE: 120 MMHG | DIASTOLIC BLOOD PRESSURE: 85 MMHG | BODY MASS INDEX: 31.39 KG/M2 | TEMPERATURE: 99.4 F | WEIGHT: 200 LBS

## 2021-09-09 DIAGNOSIS — I83.813 VARICOSE VEINS OF BILATERAL LOWER EXTREMITIES WITH PAIN: Primary | ICD-10-CM

## 2021-09-09 PROCEDURE — 99024 POSTOP FOLLOW-UP VISIT: CPT | Performed by: NURSE PRACTITIONER

## 2021-09-09 NOTE — ASSESSMENT & PLAN NOTE
61-year-old male with GERD, hyperlipidemia, osteoarthritis status post right total knee replacement, bilateral lower extremity varicosities with significant pain and discomfort, R>L now s/p Bilateral stab phlebectomies, R phlebs x 18 and L phlebs x 11 by Dr Laz Lopez 8/13/21 who returns to the office for 4 week post op visit   -Phleb sites healing nicely   -Complete resolution of preoperative venous symptoms   -Follow up PRN

## 2021-09-09 NOTE — PROGRESS NOTES
Assessment/Plan:    Varicose veins of bilateral lower extremities with pain  28-year-old male with GERD, hyperlipidemia, osteoarthritis status post right total knee replacement, bilateral lower extremity varicosities with significant pain and discomfort, R>L now s/p Bilateral stab phlebectomies, R phlebs x 18 and L phlebs x 11 by Dr Chris Prader 8/13/21 who returns to the office for 4 week post op visit   -Phleb sites healing nicely   -Complete resolution of preoperative venous symptoms   -Follow up PRN       Diagnoses and all orders for this visit:    Varicose veins of bilateral lower extremities with pain          Subjective:      Patient ID: Kerry Fisher is a 62 y o  male  HPI  Patient is present today for post op B/L stab phlebs done on 8/13/21 by Dr Chris Prader  Patient denies chills and fever  Patient report that he stopped getting pain after the procedure  Stab site is looking clean and is healing well  Patient is a former smoker  Patient is taking atorvastatin  The following portions of the patient's history were reviewed and updated as appropriate: allergies, current medications, past family history, past medical history, past social history, past surgical history and problem list   ROS reviewed     Review of Systems   Constitutional: Negative  HENT: Negative  Eyes: Negative  Respiratory: Negative  Cardiovascular: Negative  Gastrointestinal: Negative  Endocrine: Negative  Genitourinary: Negative  Musculoskeletal: Negative  Skin: Negative  Allergic/Immunologic: Negative  Neurological: Negative  Hematological: Negative  Psychiatric/Behavioral: Negative  Objective:  I have reviewed and made appropriate changes to the review of systems input by the medical assistant      Vitals:    09/09/21 0835   BP: 120/85   BP Location: Left arm   Patient Position: Sitting   Cuff Size: Standard   Pulse: 82   Temp: 99 4 °F (37 4 °C)   TempSrc: Tympanic   Weight: 90 7 kg (200 lb)   Height: 5' 7" (1 702 m)       Patient Active Problem List   Diagnosis    Varicose veins of bilateral lower extremities with pain    GERD (gastroesophageal reflux disease)    Hypercholesterolemia    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    Colon polyps    Schatzki's ring of distal esophagus    Osteoarthritis of both knees    COVID-19 virus infection       Past Surgical History:   Procedure Laterality Date    ADENOIDECTOMY      JOINT REPLACEMENT Right     knee    KNEE SURGERY      DC PHLEB VEINS - EXTREM 20+ Bilateral 2021    Procedure: MULTIPLE STAB PHLEBECTOMIES, 18 RIGHT, 11 LEFT;  Surgeon: Panda Nava MD;  [facility] MAIN [facility];  Service: Vascular    TONSILLECTOMY         Family History   Problem Relation Age of Onset    Varicose Veins Father        Social History     Socioeconomic History    Marital status: Single     Spouse name: Not on file    Number of children: Not on file    Years of education: Not on file    Highest education level: Not on file   Occupational History    Not on file   Tobacco Use    Smoking status: Former Smoker     Types: Cigarettes    Smokeless tobacco: Never Used   Vaping Use    Vaping Use: Never used   Substance and Sexual Activity    Alcohol use: Yes    Drug use: Not Currently    Sexual activity: Not on file   Other Topics Concern    Not on file   Social History Narrative    Most recent tobacco use screenin2018     Social Determinants of Health     Financial Resource Strain:     Difficulty of Paying Living Expenses:    Food Insecurity:     Worried About Running Out of Food in the Last Year:     Ran Out of Food in the Last Year:    Transportation Needs:     Lack of Transportation (Medical):      Lack of Transportation (Non-Medical):    Physical Activity:     Days of Exercise per Week:     Minutes of Exercise per Session:    Stress:     Feeling of Stress :    Social Connections:     Frequency of Communication with Friends and Family:     Frequency of Social Gatherings with Friends and Family:     Attends Sikhism Services:     Active Member of Clubs or Organizations:     Attends Club or Organization Meetings:     Marital Status:    Intimate Partner Violence:     Fear of Current or Ex-Partner:     Emotionally Abused:     Physically Abused:     Sexually Abused:        No Known Allergies      Current Outpatient Medications:     atorvastatin (LIPITOR) 40 mg tablet, TAKE 1 TABLET BY MOUTH DAILY AT BEDTIME, Disp: 90 tablet, Rfl: 1    pantoprazole (PROTONIX) 40 mg tablet, TAKE 1 TABLET BY MOUTH EVERY DAY, Disp: 90 tablet, Rfl: 1      /85 (BP Location: Left arm, Patient Position: Sitting, Cuff Size: Standard)   Pulse 82   Temp 99 4 °F (37 4 °C) (Tympanic)   Ht 5' 7" (1 702 m)   Wt 90 7 kg (200 lb)   BMI 31 32 kg/m²          Physical Exam  Vitals and nursing note reviewed  HENT:      Head: Normocephalic and atraumatic  Eyes:      Extraocular Movements: Extraocular movements intact  Cardiovascular:      Heart sounds: Normal heart sounds  Pulmonary:      Effort: Pulmonary effort is normal    Musculoskeletal:         General: No swelling  Comments: Phlebectomy sites healed/scabbed over on the right    Neurological:      General: No focal deficit present  Mental Status: He is oriented to person, place, and time     Psychiatric:         Mood and Affect: Mood normal          Behavior: Behavior normal

## 2021-11-07 DIAGNOSIS — K21.9 GASTROESOPHAGEAL REFLUX DISEASE: ICD-10-CM

## 2021-11-08 RX ORDER — PANTOPRAZOLE SODIUM 40 MG/1
TABLET, DELAYED RELEASE ORAL
Qty: 90 TABLET | Refills: 1 | Status: SHIPPED | OUTPATIENT
Start: 2021-11-08

## 2022-02-14 DIAGNOSIS — E78.00 HYPERCHOLESTEROLEMIA: ICD-10-CM

## 2022-02-14 RX ORDER — ATORVASTATIN CALCIUM 40 MG/1
TABLET, FILM COATED ORAL
Qty: 90 TABLET | Refills: 1 | Status: SHIPPED | OUTPATIENT
Start: 2022-02-14

## 2022-05-24 ENCOUNTER — TELEPHONE (OUTPATIENT)
Dept: GASTROENTEROLOGY | Facility: CLINIC | Age: 62
End: 2022-05-24

## 2022-05-24 NOTE — TELEPHONE ENCOUNTER
Scheduled date of EGD/colonoscopy (as of today): 7/25/22  Physician performing EGD/colonoscopy:   Dr Heaven Martinez  Location of EGD/colonoscopy: [facility]  Desired bowel prep reviewed with patient: miralax w/ dul  Instructions reviewed with patient by: ray  Clearances:  N/a

## 2022-05-24 NOTE — TELEPHONE ENCOUNTER
[facility] Assessment    Name: Joel Woodard  YOB: 1960  Last Height: 5' 7" (1 702 m)  Last weight: 90 7 kg (200 lb)  BMI: 31 32 kg/m²  Procedure: EGD/Colon  Diagnosis: mixon's/hx of ta/hx of tva  Date of procedure: 7/25/22  Prep: miralax w/ dul  Responsible : yes  Phone#: [phone]  Name completing form: Nanette Chase  Date form completed: 05/24/22      If the patient answers yes to any of these questions, schedule in a hospital  Are you pregnant: No  Do you rely on a wheelchair for mobility: No  Have you been diagnosed with End Stage Renal Disease (ESRD): No  Do you need oxygen during the day: No  Have you had a heart attack or stroke within the past three months: No  Have you had a seizure within the past three months: No  Have you ever been informed by anesthesia that you have a difficult airway: No  Additional Questions  Have you had any cardiac testing or are under the care of a Cardiologist (see cardiac list): No  Cardiac list:   Do you have an implanted cardiac defibrillator: No (Comment:  This patient should be scheduled in the hospital)    Have any bleeding problems, such as anemia or hemophilia (If patient has H&H result below 8, schedule in hospital   H&H must be within 30 days of procedure): No    Had an organ transplant within the past 3 months: No    Do you have any present infections: No  Do you get short of breath when walking a few blocks: No  Have you been diagnosed with diabetes: No  Comments (provide cardiac provider information if applicable):

## 2022-05-24 NOTE — TELEPHONE ENCOUNTER
Patients GI provider:  Dr Junior Carrillo    Number to return call: [phone]    Reason for call: Pt calling to schedule his colonoscopy and EGD      Scheduled procedure/appointment date if applicable: N/A

## 2022-06-14 ENCOUNTER — OFFICE VISIT (OUTPATIENT)
Dept: FAMILY MEDICINE CLINIC | Facility: CLINIC | Age: 62
End: 2022-06-14
Payer: COMMERCIAL

## 2022-06-14 VITALS
WEIGHT: 200 LBS | DIASTOLIC BLOOD PRESSURE: 84 MMHG | SYSTOLIC BLOOD PRESSURE: 130 MMHG | OXYGEN SATURATION: 97 % | HEIGHT: 67 IN | TEMPERATURE: 97.1 F | RESPIRATION RATE: 18 BRPM | BODY MASS INDEX: 31.39 KG/M2 | HEART RATE: 80 BPM

## 2022-06-14 DIAGNOSIS — R20.2 NUMBNESS AND TINGLING OF BOTH LOWER EXTREMITIES: Primary | ICD-10-CM

## 2022-06-14 DIAGNOSIS — Z11.59 NEED FOR HEPATITIS C SCREENING TEST: ICD-10-CM

## 2022-06-14 DIAGNOSIS — F10.10 ALCOHOL ABUSE: ICD-10-CM

## 2022-06-14 DIAGNOSIS — Z11.4 SCREENING FOR HIV (HUMAN IMMUNODEFICIENCY VIRUS): ICD-10-CM

## 2022-06-14 DIAGNOSIS — R20.0 NUMBNESS AND TINGLING OF BOTH LOWER EXTREMITIES: Primary | ICD-10-CM

## 2022-06-14 PROCEDURE — 3008F BODY MASS INDEX DOCD: CPT | Performed by: FAMILY MEDICINE

## 2022-06-14 PROCEDURE — 1036F TOBACCO NON-USER: CPT | Performed by: FAMILY MEDICINE

## 2022-06-14 PROCEDURE — 99214 OFFICE O/P EST MOD 30 MIN: CPT | Performed by: FAMILY MEDICINE

## 2022-06-14 PROCEDURE — 3725F SCREEN DEPRESSION PERFORMED: CPT | Performed by: FAMILY MEDICINE

## 2022-06-14 PROCEDURE — 96372 THER/PROPH/DIAG INJ SC/IM: CPT | Performed by: FAMILY MEDICINE

## 2022-06-14 RX ORDER — FOLIC ACID 1 MG/1
1 TABLET ORAL DAILY
Qty: 90 TABLET | Refills: 1 | Status: SHIPPED | OUTPATIENT
Start: 2022-06-14

## 2022-06-14 RX ORDER — CYANOCOBALAMIN 1000 UG/ML
1000 INJECTION INTRAMUSCULAR; SUBCUTANEOUS
Status: SHIPPED | OUTPATIENT
Start: 2022-06-14

## 2022-06-14 RX ADMIN — CYANOCOBALAMIN 1000 MCG: 1000 INJECTION INTRAMUSCULAR; SUBCUTANEOUS at 09:40

## 2022-06-14 NOTE — PROGRESS NOTES
Subjective:      Patient ID: Dominique Bartlett is a 62 y o  male  Reports bilateral thigh burning and tingling sensation, he does drink alcohol- heavy use- >3 hard liquor containing drink /day    Leg Pain   Pertinent negatives include no numbness  Past Medical History:   Diagnosis Date    GERD (gastroesophageal reflux disease)     Hyperlipidemia        Family History   Problem Relation Age of Onset    Varicose Veins Father        Past Surgical History:   Procedure Laterality Date    ADENOIDECTOMY      JOINT REPLACEMENT Right     knee    KNEE SURGERY      TN PHLEB VEINS - EXTREM 20+ Bilateral 8/13/2021    Procedure: MULTIPLE STAB PHLEBECTOMIES, 18 RIGHT, 11 LEFT;  Surgeon: Thomas Hannon MD;  [facility] MAIN OR;  Service: Vascular    TONSILLECTOMY          reports that he has quit smoking  His smoking use included cigarettes  He has never used smokeless tobacco  He reports current alcohol use  He reports previous drug use  Current Outpatient Medications:     atorvastatin (LIPITOR) 40 mg tablet, TAKE 1 TABLET BY MOUTH EVERYDAY AT BEDTIME, Disp: 90 tablet, Rfl: 1    folic acid (KP Folic Acid) 1 mg tablet, Take 1 tablet (1 mg total) by mouth daily, Disp: 90 tablet, Rfl: 1    pantoprazole (PROTONIX) 40 mg tablet, TAKE 1 TABLET BY MOUTH EVERY DAY, Disp: 90 tablet, Rfl: 1    Current Facility-Administered Medications:     cyanocobalamin injection 1,000 mcg, 1,000 mcg, Intramuscular, Q30 Days, Sandra Haji MD, 1,000 mcg at 06/14/22 0940    The following portions of the patient's history were reviewed and updated as appropriate: allergies, current medications, past family history, past medical history, past social history, past surgical history and problem list     Review of Systems   Constitutional: Negative for chills and fever  HENT: Negative for congestion, rhinorrhea and sore throat  Eyes: Negative for discharge, redness and itching     Respiratory: Negative for chest tightness, shortness of breath and wheezing  Cardiovascular: Negative for chest pain and palpitations  Gastrointestinal: Negative for abdominal pain, constipation and diarrhea  Genitourinary: Negative for dysuria  Skin: Negative for pallor and rash  Neurological: Negative for dizziness, weakness, numbness and headaches  PHQ-2/9 Depression Screening    Little interest or pleasure in doing things: 0 - not at all  Feeling down, depressed, or hopeless: 0 - not at all  PHQ-2 Score: 0  PHQ-2 Interpretation: Negative depression screen             Objective:    /84 (BP Location: Left arm, Patient Position: Sitting, Cuff Size: Adult)   Pulse 80   Temp (!) 97 1 °F (36 2 °C) (Temporal)   Resp 18   Ht 5' 7" (1 702 m)   Wt 90 7 kg (200 lb)   SpO2 97%   BMI 31 32 kg/m²      Physical Exam  Vitals and nursing note reviewed  Constitutional:       Appearance: Normal appearance  HENT:      Right Ear: External ear normal       Left Ear: External ear normal    Eyes:      General:         Right eye: No discharge  Left eye: No discharge  Conjunctiva/sclera: Conjunctivae normal    Cardiovascular:      Rate and Rhythm: Normal rate and regular rhythm  Heart sounds: No murmur heard  Pulmonary:      Effort: Pulmonary effort is normal       Breath sounds: Normal breath sounds  No wheezing  Abdominal:      General: There is no distension  Palpations: Abdomen is soft  Tenderness: There is no abdominal tenderness  Musculoskeletal:         General: No tenderness or deformity  Right lower leg: No edema  Left lower leg: No edema  Skin:     Findings: No lesion or rash  Neurological:      Mental Status: He is alert  Mental status is at baseline  Psychiatric:         Mood and Affect: Mood normal          Thought Content:  Thought content normal                Assessment/Plan:         Diagnoses and all orders for this visit:    Numbness and tingling of both lower extremities  - Folate; Future  -     Vitamin B12; Future  -     cyanocobalamin injection 1,000 mcg  -     Vitamin D 25 hydroxy; Future  -     Methylmalonic acid, serum; Future  -     folic acid (KP Folic Acid) 1 mg tablet; Take 1 tablet (1 mg total) by mouth daily    Need for hepatitis C screening test    Screening for HIV (human immunodeficiency virus)    Alcohol abuse  -     Hepatic function panel; Future  -     Methylmalonic acid, serum; Future  -     folic acid (KP Folic Acid) 1 mg tablet; Take 1 tablet (1 mg total) by mouth daily          I have reviewed CBC, BMP, A1C FROM 8/2021, WERE NORMAL,   CHECK LABS AS ABOVE  B12 INJECTION TODAY  START SUBLINGUAL P62 AND FOLIC ACID  SUSPECT SEVERE B12 DEFICIENCY-CLINICALLY- THE LABS MAY VARY  cOUNSELED TO stop alcohol use completely  Read package inserts for all medications before starting a new medications, call me if you have any questions  Patient was given opportunity to ask questions and all questions were answered  Portions of the record may have been created with voice recognition software  Occasional wrong word or "sound a like" substitutions may have occurred due to the inherent limitations of voice recognition software  Read the chart carefully and recognize, using context, where substitutions have occurred

## 2022-06-16 ENCOUNTER — APPOINTMENT (OUTPATIENT)
Dept: LAB | Facility: CLINIC | Age: 62
End: 2022-06-16
Payer: COMMERCIAL

## 2022-06-16 DIAGNOSIS — R20.2 NUMBNESS AND TINGLING OF BOTH LOWER EXTREMITIES: ICD-10-CM

## 2022-06-16 DIAGNOSIS — R20.0 NUMBNESS AND TINGLING OF BOTH LOWER EXTREMITIES: ICD-10-CM

## 2022-06-16 DIAGNOSIS — F10.10 ALCOHOL ABUSE: ICD-10-CM

## 2022-06-16 LAB
25(OH)D3 SERPL-MCNC: 48 NG/ML (ref 30–100)
ALBUMIN SERPL BCP-MCNC: 4.2 G/DL (ref 3.5–5)
ALP SERPL-CCNC: 70 U/L (ref 34–104)
ALT SERPL W P-5'-P-CCNC: 42 U/L (ref 7–52)
AST SERPL W P-5'-P-CCNC: 43 U/L (ref 13–39)
BILIRUB DIRECT SERPL-MCNC: 0.08 MG/DL (ref 0–0.2)
BILIRUB SERPL-MCNC: 0.74 MG/DL (ref 0.2–1)
FOLATE SERPL-MCNC: 18.5 NG/ML (ref 3.1–17.5)
PROT SERPL-MCNC: 7.1 G/DL (ref 6.4–8.4)
VIT B12 SERPL-MCNC: 1711 PG/ML (ref 100–900)

## 2022-06-16 PROCEDURE — 82607 VITAMIN B-12: CPT

## 2022-06-16 PROCEDURE — 83918 ORGANIC ACIDS TOTAL QUANT: CPT

## 2022-06-16 PROCEDURE — 80076 HEPATIC FUNCTION PANEL: CPT

## 2022-06-16 PROCEDURE — 82746 ASSAY OF FOLIC ACID SERUM: CPT

## 2022-06-16 PROCEDURE — 82306 VITAMIN D 25 HYDROXY: CPT

## 2022-06-16 PROCEDURE — 36415 COLL VENOUS BLD VENIPUNCTURE: CPT

## 2022-06-22 LAB — METHYLMALONATE SERPL-SCNC: 116 NMOL/L (ref 0–378)

## 2022-08-03 ENCOUNTER — APPOINTMENT (OUTPATIENT)
Dept: PHYSICAL THERAPY | Facility: CLINIC | Age: 62
End: 2022-08-03
Payer: COMMERCIAL

## 2022-08-03 PROCEDURE — 97530 THERAPEUTIC ACTIVITIES: CPT

## 2022-09-13 ENCOUNTER — TELEPHONE (OUTPATIENT)
Dept: GASTROENTEROLOGY | Facility: AMBULARY SURGERY CENTER | Age: 62
End: 2022-09-13

## 2022-09-13 NOTE — TELEPHONE ENCOUNTER
Patients GI provider:  Dr Simeon Jain     Number to return call: [phone]    Reason for call: Pt calling to schedule repeat procedure    Scheduled procedure/appointment date if applicable: Apt/procedure n/a

## 2022-09-14 NOTE — TELEPHONE ENCOUNTER
Called and lmom asking pt to call back to get scheduled for flip  I will try calling again in 1 week  If he calls back, please get him rescheduled  Orders are in the chart  We WILL NEED to get his new insurance info also  Thank you

## 2022-09-21 NOTE — TELEPHONE ENCOUNTER
I lmom for pt to please call back to schedule his procedures with Dr Vigil Farm  Will call pt again in two weeks if do not hear back from him

## 2022-10-05 NOTE — TELEPHONE ENCOUNTER
Called and lmom asking pt to call back to get scheduled  At this point we will wait to hear back from him  If he calls back, please get him scheduled  Thank you

## 2022-11-23 DIAGNOSIS — R20.2 NUMBNESS AND TINGLING OF BOTH LOWER EXTREMITIES: ICD-10-CM

## 2022-11-23 DIAGNOSIS — F10.10 ALCOHOL ABUSE: ICD-10-CM

## 2022-11-23 DIAGNOSIS — R20.0 NUMBNESS AND TINGLING OF BOTH LOWER EXTREMITIES: ICD-10-CM

## 2022-11-23 RX ORDER — FOLIC ACID 1 MG/1
TABLET ORAL
Qty: 90 TABLET | Refills: 1 | Status: SHIPPED | OUTPATIENT
Start: 2022-11-23

## 2023-01-16 ENCOUNTER — TELEPHONE (OUTPATIENT)
Dept: GASTROENTEROLOGY | Facility: CLINIC | Age: 63
End: 2023-01-16

## 2023-01-16 NOTE — TELEPHONE ENCOUNTER
Scheduled date of EGD/colonoscopy (as of today): 2/2/23    Physician performing EGD/colonoscopy:Haylie    Location of EGD/colonoscopy: [facility]

## 2023-01-16 NOTE — TELEPHONE ENCOUNTER
[facility] Assessment    Name: Jaelyn Portillo  YOB: 1960  Last Height: 5' 7" (1 702 m)  Last weight: 90 7 kg (200 lb)  BMI: 31 32 kg/m²  Procedure: Colon/EGD  Diagnosis:Black's/HX  of Polyps  Date of procedure: 2/2/23  Prep: ?  Responsible : Yes  Phone#: ?  Name completing form: Palma Gage  Date form completed: 01/16/23      If the patient answers yes to any of these questions, schedule in a hospital  Are you pregnant: No  Do you rely on a wheelchair for mobility: No  Have you been diagnosed with End Stage Renal Disease (ESRD): No  Do you need oxygen during the day: No  Have you had a heart attack or stroke within the past three months: No  Have you had a seizure within the past three months: No  Have you ever been informed by anesthesia that you have a difficult airway: No  Additional Questions  Have you had any cardiac testing or are under the care of a Cardiologist (see cardiac list): No  Cardiac list:   Do you have an implanted cardiac defibrillator: No (Comment:  This patient should be scheduled in the hospital)    Have any bleeding problems, such as anemia or hemophilia (If patient has H&H result below 8, schedule in hospital   H&H must be within 30 days of procedure): No    Had an organ transplant within the past 3 months: No    Do you have any present infections: No  Do you get short of breath when walking a few blocks: No  Have you been diagnosed with diabetes: No  Comments (provide cardiac provider information if applicable):

## 2023-01-25 ENCOUNTER — TELEPHONE (OUTPATIENT)
Dept: GASTROENTEROLOGY | Facility: CLINIC | Age: 63
End: 2023-01-25

## 2023-01-25 NOTE — TELEPHONE ENCOUNTER
lmom confirming pt's colonoscopy/egd scheduled on 2/2/23 at [facility] with Dr Rivera Cain   Informed [facility] would be calling 1-2 days prior with the arrival time  Informed of clear liquid diet day prior as well as the bowel cleansing preparation  Informed would need a  the day of the procedure due to being under sedation  I informed pt that I would be emailing him the instructions for the miralax w/ dulcolax preparation as I am not sure if he still has the instructions from May  I asked him to please call back if he does not receive or if he has any questions

## 2023-01-31 ENCOUNTER — OFFICE VISIT (OUTPATIENT)
Dept: FAMILY MEDICINE CLINIC | Facility: CLINIC | Age: 63
End: 2023-01-31

## 2023-01-31 VITALS
SYSTOLIC BLOOD PRESSURE: 130 MMHG | DIASTOLIC BLOOD PRESSURE: 80 MMHG | BODY MASS INDEX: 32.96 KG/M2 | WEIGHT: 210 LBS | HEIGHT: 67 IN | HEART RATE: 85 BPM | OXYGEN SATURATION: 99 % | RESPIRATION RATE: 18 BRPM | TEMPERATURE: 98.1 F

## 2023-01-31 DIAGNOSIS — R73.01 IMPAIRED FASTING GLUCOSE: ICD-10-CM

## 2023-01-31 DIAGNOSIS — Z00.8 EXAM FOR POPULATION SURVEY: ICD-10-CM

## 2023-01-31 DIAGNOSIS — K63.5 POLYP OF COLON, UNSPECIFIED PART OF COLON, UNSPECIFIED TYPE: ICD-10-CM

## 2023-01-31 DIAGNOSIS — K21.9 GASTROESOPHAGEAL REFLUX DISEASE, UNSPECIFIED WHETHER ESOPHAGITIS PRESENT: ICD-10-CM

## 2023-01-31 DIAGNOSIS — E78.00 HYPERCHOLESTEROLEMIA: Primary | ICD-10-CM

## 2023-01-31 DIAGNOSIS — Z12.5 PROSTATE CANCER SCREENING: ICD-10-CM

## 2023-01-31 PROBLEM — Z87.891 FORMER SMOKER: Status: ACTIVE | Noted: 2023-01-31

## 2023-01-31 RX ORDER — UREA 10 %
100 LOTION (ML) TOPICAL DAILY
COMMUNITY

## 2023-01-31 NOTE — ASSESSMENT & PLAN NOTE
Recheck lipids and LFTs  Continue atorvastatin 40 mg qhs  Advised pt to follow a low cholesterol diet and to exercise on a regular basis

## 2023-01-31 NOTE — PROGRESS NOTES
BMI Counseling: There is no height or weight on file to calculate BMI  The BMI is above normal  Nutrition recommendations include decreasing portion sizes, encouraging healthy choices of fruits and vegetables, consuming healthier snacks and moderation in carbohydrate intake  Exercise recommendations include exercising 3-5 times per week  No pharmacotherapy was ordered  Rationale for BMI follow-up plan is due to patient being overweight or obese  Depression Screening and Follow-up Plan: Patient was screened for depression during today's encounter  They screened negative with a PHQ-2 score of 0  Assessment/Plan:         Problem List Items Addressed This Visit        Digestive    GERD (gastroesophageal reflux disease)     Pt gets sxs at times  Stopped med 1 year ago  Last EGD was in Oct 2019 by Dr Osiris Roblero and pt due for another  Going for EGD on 2/2  Colon polyps     Last colonoscopy was in Oct 2019 by Dr Osiris Roblero and pt had 8 polyps  Pt due for another  Going for colo on 2/2  Endocrine    Impaired fasting glucose     Recheck A1C  Relevant Orders    Comprehensive metabolic panel    Hemoglobin A1C       Other    Hypercholesterolemia - Primary     Recheck lipids and LFTs  Continue atorvastatin 40 mg qhs  Advised pt to follow a low cholesterol diet and to exercise on a regular basis  Relevant Orders    Lipid panel    Comprehensive metabolic panel   Other Visit Diagnoses     Prostate cancer screening        Relevant Orders    PSA, Total Screen    Exam for population survey        Relevant Orders    Lipid panel    Comprehensive metabolic panel    CBC and differential    PSA, Total Screen    Hemoglobin A1C            Subjective:      Patient ID: Hima Neely is a 65 y o  male  Pt here for f/u HL, Impaired Fasting Glucose, GERD, Colon Polyps  Pt doing ok  No cp/sob  No headaches  Still gets GERD and stopped pantoprazole 1 year ago  Going to get EGD and colo in 2 days   No regular exercise but going to start using exercise bike more  Stopped smoking 3 mths ago and no ETOH since Jan 1st  Had COVID booster  Doesn't want flu shot  Gets knee pain and back pain at times  Has physical job  The following portions of the patient's history were reviewed and updated as appropriate:   Past Medical History:  He has a past medical history of GERD (gastroesophageal reflux disease) and Hyperlipidemia ,  _______________________________________________________________________  Medical Problems:  does not have any pertinent problems on file ,  _______________________________________________________________________  Past Surgical History:   has a past surgical history that includes ADENOIDECTOMY; Tonsillectomy; Knee surgery; Joint replacement (Right); and pr stab phlebt varicose veins 1 xtr > 20 incs (Bilateral, 8/13/2021)  ,  _______________________________________________________________________  Family History:  family history includes Breast cancer in his mother; Cancer in his father; Varicose Veins in his father ,  _______________________________________________________________________  Social History:   reports that he has quit smoking  His smoking use included cigarettes  He has never used smokeless tobacco  He reports that he does not currently use alcohol  He reports that he does not currently use drugs  ,  _______________________________________________________________________  Allergies:  has No Known Allergies     _______________________________________________________________________  Current Outpatient Medications   Medication Sig Dispense Refill   • atorvastatin (LIPITOR) 40 mg tablet TAKE 1 TABLET BY MOUTH EVERYDAY AT BEDTIME 90 tablet 1   • folic acid (FOLVITE) 1 mg tablet TAKE 1 TABLET BY MOUTH EVERY DAY 90 tablet 1   • vitamin B-12 (CYANOCOBALAMIN) 100 MCG tablet Take 100 mcg by mouth daily       No current facility-administered medications for this visit  _______________________________________________________________________  Review of Systems   Constitutional: Negative for fatigue and unexpected weight change  Respiratory: Negative for cough and shortness of breath  Cardiovascular: Negative for chest pain  Gastrointestinal: Negative for abdominal pain, constipation, diarrhea and vomiting  Musculoskeletal: Positive for arthralgias and back pain  Neurological: Negative for dizziness and headaches  Psychiatric/Behavioral: Negative for dysphoric mood  The patient is not nervous/anxious  Objective:  Vitals:    01/31/23 0751 01/31/23 0807   BP: 130/90 130/80   BP Location: Left arm Left arm   Patient Position: Sitting Sitting   Cuff Size: Standard Standard   Pulse: 85    Resp: 18    Temp: 98 1 °F (36 7 °C)    TempSrc: Tympanic    SpO2: 99%    Weight: 95 3 kg (210 lb)    Height: 5' 7" (1 702 m)      Body mass index is 32 89 kg/m²  Physical Exam  Vitals and nursing note reviewed  Constitutional:       Appearance: Normal appearance  He is well-developed  He is obese  Neck:      Thyroid: No thyromegaly  Cardiovascular:      Rate and Rhythm: Normal rate and regular rhythm  Heart sounds: Normal heart sounds  No murmur heard  Pulmonary:      Effort: Pulmonary effort is normal  No respiratory distress  Breath sounds: Normal breath sounds  No wheezing  Musculoskeletal:      Cervical back: Normal range of motion and neck supple  Right lower leg: No edema  Left lower leg: No edema  Lymphadenopathy:      Cervical: No cervical adenopathy  Neurological:      Mental Status: He is alert and oriented to person, place, and time  Cranial Nerves: No cranial nerve deficit  Psychiatric:         Mood and Affect: Mood normal          Behavior: Behavior normal          Thought Content:  Thought content normal          Judgment: Judgment normal

## 2023-01-31 NOTE — ASSESSMENT & PLAN NOTE
Pt gets sxs at times  Stopped med 1 year ago  Last EGD was in Oct 2019 by Dr Angie Castelan and pt due for another  Going for EGD on 2/2

## 2023-01-31 NOTE — ASSESSMENT & PLAN NOTE
Last colonoscopy was in Oct 2019 by Dr Anastacio Adhikari and pt had 8 polyps  Pt due for another  Going for colo on 2/2

## 2023-02-02 ENCOUNTER — ANESTHESIA EVENT (OUTPATIENT)
Dept: GASTROENTEROLOGY | Facility: AMBULATORY SURGERY CENTER | Age: 63
End: 2023-02-02

## 2023-02-02 ENCOUNTER — ANESTHESIA (OUTPATIENT)
Dept: GASTROENTEROLOGY | Facility: AMBULATORY SURGERY CENTER | Age: 63
End: 2023-02-02

## 2023-02-02 ENCOUNTER — HOSPITAL ENCOUNTER (OUTPATIENT)
Dept: GASTROENTEROLOGY | Facility: AMBULATORY SURGERY CENTER | Age: 63
Discharge: HOME | End: 2023-02-02

## 2023-02-02 VITALS
HEART RATE: 69 BPM | OXYGEN SATURATION: 96 % | WEIGHT: 210 LBS | TEMPERATURE: 97.4 F | DIASTOLIC BLOOD PRESSURE: 76 MMHG | HEIGHT: 67 IN | BODY MASS INDEX: 32.96 KG/M2 | RESPIRATION RATE: 18 BRPM | SYSTOLIC BLOOD PRESSURE: 112 MMHG

## 2023-02-02 DIAGNOSIS — K21.9 GASTROESOPHAGEAL REFLUX DISEASE, UNSPECIFIED WHETHER ESOPHAGITIS PRESENT: ICD-10-CM

## 2023-02-02 DIAGNOSIS — K22.2 SCHATZKI'S RING OF DISTAL ESOPHAGUS: ICD-10-CM

## 2023-02-02 DIAGNOSIS — K63.5 POLYP OF COLON, UNSPECIFIED PART OF COLON, UNSPECIFIED TYPE: ICD-10-CM

## 2023-02-02 DIAGNOSIS — K22.70 BARRETT'S ESOPHAGUS WITHOUT DYSPLASIA: ICD-10-CM

## 2023-02-02 RX ORDER — SODIUM CHLORIDE, SODIUM LACTATE, POTASSIUM CHLORIDE, CALCIUM CHLORIDE 600; 310; 30; 20 MG/100ML; MG/100ML; MG/100ML; MG/100ML
INJECTION, SOLUTION INTRAVENOUS CONTINUOUS PRN
Status: DISCONTINUED | OUTPATIENT
Start: 2023-02-02 | End: 2023-02-02

## 2023-02-02 RX ORDER — OMEPRAZOLE 40 MG/1
40 CAPSULE, DELAYED RELEASE ORAL DAILY
Qty: 30 CAPSULE | Refills: 11 | Status: SHIPPED | OUTPATIENT
Start: 2023-02-02

## 2023-02-02 RX ORDER — PROPOFOL 10 MG/ML
INJECTION, EMULSION INTRAVENOUS AS NEEDED
Status: DISCONTINUED | OUTPATIENT
Start: 2023-02-02 | End: 2023-02-02

## 2023-02-02 RX ADMIN — PROPOFOL 50 MG: 10 INJECTION, EMULSION INTRAVENOUS at 07:49

## 2023-02-02 RX ADMIN — PROPOFOL 100 MG: 10 INJECTION, EMULSION INTRAVENOUS at 07:47

## 2023-02-02 RX ADMIN — PROPOFOL 100 MG: 10 INJECTION, EMULSION INTRAVENOUS at 07:35

## 2023-02-02 RX ADMIN — PROPOFOL 30 MG: 10 INJECTION, EMULSION INTRAVENOUS at 07:38

## 2023-02-02 RX ADMIN — SODIUM CHLORIDE, SODIUM LACTATE, POTASSIUM CHLORIDE, CALCIUM CHLORIDE: 600; 310; 30; 20 INJECTION, SOLUTION INTRAVENOUS at 07:54

## 2023-02-02 RX ADMIN — PROPOFOL 100 MG: 10 INJECTION, EMULSION INTRAVENOUS at 07:52

## 2023-02-02 RX ADMIN — PROPOFOL 20 MG: 10 INJECTION, EMULSION INTRAVENOUS at 07:40

## 2023-02-02 RX ADMIN — PROPOFOL 100 MG: 10 INJECTION, EMULSION INTRAVENOUS at 07:33

## 2023-02-02 RX ADMIN — PROPOFOL 50 MG: 10 INJECTION, EMULSION INTRAVENOUS at 07:37

## 2023-02-02 RX ADMIN — SODIUM CHLORIDE, SODIUM LACTATE, POTASSIUM CHLORIDE, CALCIUM CHLORIDE: 600; 310; 30; 20 INJECTION, SOLUTION INTRAVENOUS at 07:02

## 2023-02-02 NOTE — ANESTHESIA POSTPROCEDURE EVALUATION
Post-Op Assessment Note    CV Status:  Stable  Pain Score: 0    Pain management: adequate     Mental Status:  Alert and awake   Hydration Status:  Euvolemic   PONV Controlled:  Controlled   Airway Patency:  Patent      Post Op Vitals Reviewed: Yes      Staff: CRNA         No notable events documented      BP   116/76   Temp  98   Pulse  87   Resp   16   SpO2   99

## 2023-02-02 NOTE — H&P
History and Physical - [facility]  Rosalind Euceda 65 y o  male MRN: [id number]                  HPI: Rosalind Euceda is a 65y o  year old male who presents for history of polyps and dysphagia      REVIEW OF SYSTEMS: Per the HPI, and otherwise unremarkable  Historical Information   Past Medical History:   Diagnosis Date   • Arthritis     osteo   • Black's esophagus    • Colon polyp    • GERD (gastroesophageal reflux disease)    • Hiatal hernia    • Hyperlipidemia      Past Surgical History:   Procedure Laterality Date   • ADENOIDECTOMY     • COLONOSCOPY     • EGD     • JOINT REPLACEMENT Right     knee   • KNEE SURGERY     • NE STAB PHLEBT VARICOSE VEINS 1 XTR > 20 INCS Bilateral 2021    Procedure: MULTIPLE STAB PHLEBECTOMIES, 18 RIGHT, 11 LEFT;  Surgeon: Cherri Otoole MD;  Location: [facility];  Service: Vascular   • TONSILLECTOMY       Social History   Social History     Substance and Sexual Activity   Alcohol Use Not Currently     Social History     Substance and Sexual Activity   Drug Use Not Currently     Social History     Tobacco Use   Smoking Status Former   • Years: 10 00   • Types: Cigarettes   • Quit date:    • Years since quittin 0   Smokeless Tobacco Never     Family History   Problem Relation Age of Onset   • Breast cancer Mother    • Cancer Father         esophageal   • Varicose Veins Father        Meds/Allergies       Current Outpatient Medications:   •  atorvastatin (LIPITOR) 40 mg tablet  •  Cholecalciferol (Vitamin D3) 125 MCG (5000 UT) CAPS  •  Omega-3 Fatty Acids (OMEGA-3 FISH OIL PO)  •  Vitamin A 2400 MCG (8000 UT) TABS  •  vitamin B-12 (CYANOCOBALAMIN) 100 MCG tablet  •  VITAMIN E PO  •  Zinc Sulfate (RBRK-993 PO)  •  folic acid (FOLVITE) 1 mg tablet  No current facility-administered medications for this encounter      Facility-Administered Medications Ordered in Other Encounters:   •  lactated ringers infusion, , Intravenous, Continuous PRN, New Bag at 02/02/23 0702    No Known Allergies    Objective     /78   Pulse 81   Temp (!) 97 4 °F (36 3 °C) (Temporal)   Resp 18   Ht 5' 7" (1 702 m)   Wt 95 3 kg (210 lb)   SpO2 100%   BMI 32 89 kg/m²       PHYSICAL EXAM    Gen: NAD  Head: NCAT  CV: RRR  CHEST: Clear  ABD: soft, NT/ND  EXT: no edema      ASSESSMENT/PLAN:  This is a 65y o  year old male here for EGD colonoscopy, and he is stable and optimized for his procedure

## 2023-02-02 NOTE — ANESTHESIA PREPROCEDURE EVALUATION
Procedure:  COLONOSCOPY  EGD    Relevant Problems   ANESTHESIA (within normal limits)      CARDIO   (+) Hypercholesterolemia      ENDO   (-) Diabetes mellitus, type 2 (HCC)   (-) Hyperthyroidism   (-) Hypothyroidism      GI/HEPATIC   (+) GERD (gastroesophageal reflux disease)      /RENAL (within normal limits)      HEMATOLOGY (within normal limits)      MUSCULOSKELETAL   (+) Osteoarthritis of both knees      NEURO/PSYCH (within normal limits)      PULMONARY   (-) Asthma   (-) Sleep apnea        Physical Exam    Airway    Mallampati score: III  TM Distance: >3 FB  Neck ROM: full     Dental   No notable dental hx     Cardiovascular      Pulmonary      Other Findings        Anesthesia Plan  ASA Score- 2     Anesthesia Type- IV sedation with anesthesia with ASA Monitors  Additional Monitors:   Airway Plan:           Plan Factors-Exercise tolerance (METS): >4 METS  Chart reviewed  EKG reviewed  Existing labs reviewed  Patient summary reviewed  Patient is not a current smoker  Induction- intravenous  Postoperative Plan-     Informed Consent- Anesthetic plan and risks discussed with patient  I personally reviewed this patient with the CRNA  Discussed and agreed on the Anesthesia Plan with the CRNA  Suzanne Prieto

## 2023-02-03 ENCOUNTER — APPOINTMENT (OUTPATIENT)
Dept: LAB | Facility: CLINIC | Age: 63
End: 2023-02-03

## 2023-02-03 DIAGNOSIS — R73.01 IMPAIRED FASTING GLUCOSE: ICD-10-CM

## 2023-02-03 DIAGNOSIS — Z00.8 EXAM FOR POPULATION SURVEY: ICD-10-CM

## 2023-02-03 DIAGNOSIS — E78.00 HYPERCHOLESTEROLEMIA: ICD-10-CM

## 2023-02-03 DIAGNOSIS — Z12.5 PROSTATE CANCER SCREENING: ICD-10-CM

## 2023-02-03 LAB
ALBUMIN SERPL BCP-MCNC: 4.2 G/DL (ref 3.5–5)
ALP SERPL-CCNC: 77 U/L (ref 34–104)
ALT SERPL W P-5'-P-CCNC: 32 U/L (ref 7–52)
ANION GAP SERPL CALCULATED.3IONS-SCNC: 7 MMOL/L (ref 4–13)
AST SERPL W P-5'-P-CCNC: 27 U/L (ref 13–39)
BASOPHILS # BLD AUTO: 0.09 THOUSANDS/ÂΜL (ref 0–0.1)
BASOPHILS NFR BLD AUTO: 1 % (ref 0–1)
BILIRUB SERPL-MCNC: 0.57 MG/DL (ref 0.2–1)
BUN SERPL-MCNC: 18 MG/DL (ref 5–25)
CALCIUM SERPL-MCNC: 9.3 MG/DL (ref 8.4–10.2)
CHLORIDE SERPL-SCNC: 107 MMOL/L (ref 96–108)
CHOLEST SERPL-MCNC: 256 MG/DL
CO2 SERPL-SCNC: 26 MMOL/L (ref 21–32)
CREAT SERPL-MCNC: 1.02 MG/DL (ref 0.6–1.3)
EOSINOPHIL # BLD AUTO: 0.34 THOUSAND/ÂΜL (ref 0–0.61)
EOSINOPHIL NFR BLD AUTO: 4 % (ref 0–6)
ERYTHROCYTE [DISTWIDTH] IN BLOOD BY AUTOMATED COUNT: 12.2 % (ref 11.6–15.1)
GFR SERPL CREATININE-BSD FRML MDRD: 78 ML/MIN/1.73SQ M
GLUCOSE P FAST SERPL-MCNC: 100 MG/DL (ref 65–99)
HCT VFR BLD AUTO: 47.8 % (ref 36.5–49.3)
HDLC SERPL-MCNC: 34 MG/DL
HGB BLD-MCNC: 15.5 G/DL (ref 12–17)
IMM GRANULOCYTES # BLD AUTO: 0.02 THOUSAND/UL (ref 0–0.2)
IMM GRANULOCYTES NFR BLD AUTO: 0 % (ref 0–2)
LDLC SERPL CALC-MCNC: 166 MG/DL (ref 0–100)
LYMPHOCYTES # BLD AUTO: 3.89 THOUSANDS/ÂΜL (ref 0.6–4.47)
LYMPHOCYTES NFR BLD AUTO: 41 % (ref 14–44)
MCH RBC QN AUTO: 30.8 PG (ref 26.8–34.3)
MCHC RBC AUTO-ENTMCNC: 32.4 G/DL (ref 31.4–37.4)
MCV RBC AUTO: 95 FL (ref 82–98)
MONOCYTES # BLD AUTO: 0.81 THOUSAND/ÂΜL (ref 0.17–1.22)
MONOCYTES NFR BLD AUTO: 9 % (ref 4–12)
NEUTROPHILS # BLD AUTO: 4.41 THOUSANDS/ÂΜL (ref 1.85–7.62)
NEUTS SEG NFR BLD AUTO: 45 % (ref 43–75)
NONHDLC SERPL-MCNC: 222 MG/DL
NRBC BLD AUTO-RTO: 0 /100 WBCS
PLATELET # BLD AUTO: 378 THOUSANDS/UL (ref 149–390)
PMV BLD AUTO: 10.7 FL (ref 8.9–12.7)
POTASSIUM SERPL-SCNC: 4.1 MMOL/L (ref 3.5–5.3)
PROT SERPL-MCNC: 7.4 G/DL (ref 6.4–8.4)
PSA SERPL-MCNC: 2.3 NG/ML (ref 0–4)
RBC # BLD AUTO: 5.04 MILLION/UL (ref 3.88–5.62)
SODIUM SERPL-SCNC: 140 MMOL/L (ref 135–147)
TRIGL SERPL-MCNC: 279 MG/DL
WBC # BLD AUTO: 9.56 THOUSAND/UL (ref 4.31–10.16)

## 2023-02-04 LAB
EST. AVERAGE GLUCOSE BLD GHB EST-MCNC: 123 MG/DL
HBA1C MFR BLD: 5.9 %

## 2023-02-22 ENCOUNTER — NURSE TRIAGE (OUTPATIENT)
Dept: OTHER | Facility: OTHER | Age: 63
End: 2023-02-22

## 2023-02-22 NOTE — TELEPHONE ENCOUNTER
Regarding: Rash  ----- Message from Jemal James RN sent at 2/22/2023  8:00 AM EST -----  "I have a rash on my left leg by my knee  I went to see my doctor that did my knee replacement  It is burning  I have been using gel for itching  It looks awful   I don't know if it is my cholesterol  medication "

## 2023-02-23 NOTE — TELEPHONE ENCOUNTER
Needs ov or go to urgent care [AUDIT-C Screening administered and reviewed] : AUDIT-C Screening administered and reviewed [Hazards of at-risk alcohol use discussed] : Hazards of at-risk alcohol use discussed [Strategies to reduce or eliminate alcohol use discussed] : Strategies to reduce or eliminate alcohol use discussed [FreeTextEntry1] : 15 [FreeTextEntry2] : reduce intake, detailed at length, all questions answered. father drinks [Encouraged to increase physical activity] : Encouraged to increase physical activity

## 2023-02-24 ENCOUNTER — OFFICE VISIT (OUTPATIENT)
Dept: FAMILY MEDICINE CLINIC | Facility: CLINIC | Age: 63
End: 2023-02-24

## 2023-02-24 VITALS
WEIGHT: 201 LBS | HEART RATE: 82 BPM | BODY MASS INDEX: 31.55 KG/M2 | OXYGEN SATURATION: 99 % | HEIGHT: 67 IN | TEMPERATURE: 97 F | SYSTOLIC BLOOD PRESSURE: 136 MMHG | DIASTOLIC BLOOD PRESSURE: 96 MMHG

## 2023-02-24 DIAGNOSIS — T14.8XXA BLISTER: ICD-10-CM

## 2023-02-24 DIAGNOSIS — K21.9 GASTROESOPHAGEAL REFLUX DISEASE, UNSPECIFIED WHETHER ESOPHAGITIS PRESENT: ICD-10-CM

## 2023-02-24 DIAGNOSIS — K22.2 SCHATZKI'S RING OF DISTAL ESOPHAGUS: ICD-10-CM

## 2023-02-24 DIAGNOSIS — L24.9 IRRITANT CONTACT DERMATITIS, UNSPECIFIED TRIGGER: Primary | ICD-10-CM

## 2023-02-24 RX ORDER — OMEPRAZOLE 40 MG/1
40 CAPSULE, DELAYED RELEASE ORAL DAILY
Qty: 90 CAPSULE | Refills: 4 | Status: SHIPPED | OUTPATIENT
Start: 2023-02-24

## 2023-02-24 RX ORDER — TRIAMCINOLONE ACETONIDE 5 MG/G
OINTMENT TOPICAL 2 TIMES DAILY
Qty: 30 G | Refills: 0 | Status: SHIPPED | OUTPATIENT
Start: 2023-02-24

## 2023-02-24 NOTE — ASSESSMENT & PLAN NOTE
LLE with erythema,maculopapular rash that is pruritic  Start triamcinolone ointment as prescribed for 14 days  Educate not to apply to blisters  F/u for worsening sxs
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
## 2023-02-28 ENCOUNTER — TELEPHONE (OUTPATIENT)
Dept: FAMILY MEDICINE CLINIC | Facility: CLINIC | Age: 63
End: 2023-02-28

## 2023-02-28 NOTE — TELEPHONE ENCOUNTER
Robert Hernandez was seen here on Friday regarding rash on legs  He didn't go to work yesterday & today, rash is not gone  He wore compression stockings & it's just TOO itchy  Can he get a work note?

## 2023-03-20 ENCOUNTER — OFFICE VISIT (OUTPATIENT)
Dept: FAMILY MEDICINE CLINIC | Facility: CLINIC | Age: 63
End: 2023-03-20

## 2023-03-20 ENCOUNTER — TELEPHONE (OUTPATIENT)
Dept: FAMILY MEDICINE CLINIC | Facility: CLINIC | Age: 63
End: 2023-03-20

## 2023-03-20 VITALS — SYSTOLIC BLOOD PRESSURE: 126 MMHG | DIASTOLIC BLOOD PRESSURE: 80 MMHG | HEART RATE: 108 BPM | TEMPERATURE: 97.8 F

## 2023-03-20 DIAGNOSIS — E78.00 HYPERCHOLESTEROLEMIA: ICD-10-CM

## 2023-03-20 DIAGNOSIS — F43.9 SITUATIONAL STRESS: Primary | ICD-10-CM

## 2023-03-20 RX ORDER — ALPRAZOLAM 0.25 MG/1
0.25 TABLET ORAL
Qty: 20 TABLET | Refills: 0 | Status: SHIPPED | OUTPATIENT
Start: 2023-03-20

## 2023-03-20 RX ORDER — ATORVASTATIN CALCIUM 40 MG/1
40 TABLET, FILM COATED ORAL
Qty: 90 TABLET | Refills: 1 | Status: SHIPPED | OUTPATIENT
Start: 2023-03-20

## 2023-03-20 NOTE — ASSESSMENT & PLAN NOTE
Increased stress at work and home the past two weeks resulting in sleep disturbance  Reports only sleeping 3-4 hrs max the past week  Discussed coping mechanisms he is actively looking for a new job and daughter should be moving in the next week  Will prescribe low dose alprazolam 20 pills PRN to help with sleep and anxiety  Discussed if becomes a long term issue will need to review other medications  Work excuse provided

## 2023-03-20 NOTE — PROGRESS NOTES
Name: Shavon Barnard      : 1960      MRN: [id number]  Encounter Provider: MARLEY Bowen  Encounter Date: 3/20/2023   Encounter department: [facility]    Assessment & Plan     1  Situational stress  Assessment & Plan:  Increased stress at work and home the past two weeks resulting in sleep disturbance  Reports only sleeping 3-4 hrs max the past week  Discussed coping mechanisms he is actively looking for a new job and daughter should be moving in the next week  Will prescribe low dose alprazolam 20 pills PRN to help with sleep and anxiety  Discussed if becomes a long term issue will need to review other medications  Work excuse provided  Orders:  -     ALPRAZolam (XANAX) 0 25 mg tablet; Take 1 tablet (0 25 mg total) by mouth daily at bedtime as needed for anxiety or sleep         Subjective      Has been having increased stress at work and home  His daughter just moved back home with her family  Has not been able to sleep more than 3-4 hrs in the past week and had to call off work  Review of Systems   Constitutional: Negative for chills and fever  HENT: Negative for congestion, ear pain and sore throat  Eyes: Negative for pain and visual disturbance  Respiratory: Negative for cough, chest tightness and shortness of breath  Cardiovascular: Negative for chest pain and palpitations  Gastrointestinal: Negative for abdominal pain, diarrhea, nausea and vomiting  Genitourinary: Negative for dysuria, hematuria and testicular pain  Musculoskeletal: Negative for arthralgias, back pain, joint swelling and neck stiffness  Skin: Negative for color change and rash  Neurological: Positive for headaches  Negative for seizures and syncope  Hematological: Negative for adenopathy  Psychiatric/Behavioral: Positive for decreased concentration and sleep disturbance   Negative for agitation, behavioral problems, confusion, dysphoric mood, hallucinations, self-injury and suicidal ideas  The patient is nervous/anxious  The patient is not hyperactive  All other systems reviewed and are negative  Current Outpatient Medications on File Prior to Visit   Medication Sig   • atorvastatin (LIPITOR) 40 mg tablet Take 1 tablet (40 mg total) by mouth daily at bedtime   • Cholecalciferol (Vitamin D3) 125 MCG (5000 UT) CAPS Take by mouth   • mupirocin (BACTROBAN) 2 % ointment Apply topically 3 (three) times a day   • Omega-3 Fatty Acids (OMEGA-3 FISH OIL PO) Take by mouth   • omeprazole (PriLOSEC) 40 MG capsule TAKE 1 CAPSULE (40 MG TOTAL) BY MOUTH DAILY  • triamcinolone (KENALOG) 0 5 % ointment Apply topically 2 (two) times a day   • vitamin B-12 (CYANOCOBALAMIN) 100 MCG tablet Take 100 mcg by mouth daily   • VITAMIN E PO Take by mouth   • Zinc Sulfate (ZINC-220 PO) Take by mouth   • folic acid (FOLVITE) 1 mg tablet TAKE 1 TABLET BY MOUTH EVERY DAY (Patient not taking: Reported on 2/24/2023)   • Vitamin A 2400 MCG (8000 UT) TABS Take by mouth (Patient not taking: Reported on 2/24/2023)   • [DISCONTINUED] atorvastatin (LIPITOR) 40 mg tablet TAKE 1 TABLET BY MOUTH EVERYDAY AT BEDTIME       Objective     /80 (BP Location: Left arm, Patient Position: Sitting)   Pulse (!) 108   Temp 97 8 °F (36 6 °C)     Physical Exam  Vitals and nursing note reviewed  Constitutional:       General: He is not in acute distress  Appearance: Normal appearance  He is normal weight  He is not ill-appearing or toxic-appearing  HENT:      Head: Normocephalic and atraumatic  Right Ear: Tympanic membrane, ear canal and external ear normal  There is no impacted cerumen  Left Ear: Tympanic membrane, ear canal and external ear normal  There is no impacted cerumen  Nose: Nose normal  No congestion or rhinorrhea  Mouth/Throat:      Mouth: Mucous membranes are moist       Pharynx: No oropharyngeal exudate or posterior oropharyngeal erythema     Eyes: General:         Right eye: No discharge  Left eye: No discharge  Extraocular Movements: Extraocular movements intact  Conjunctiva/sclera: Conjunctivae normal       Pupils: Pupils are equal, round, and reactive to light  Neck:      Vascular: No carotid bruit  Cardiovascular:      Rate and Rhythm: Regular rhythm  Tachycardia present  Pulses: Normal pulses  Heart sounds: Normal heart sounds  No murmur heard  Pulmonary:      Effort: Pulmonary effort is normal  No respiratory distress  Breath sounds: Normal breath sounds  No wheezing  Chest:      Chest wall: No tenderness  Abdominal:      General: Bowel sounds are normal  There is no distension  Palpations: Abdomen is soft  There is no mass  Tenderness: There is no abdominal tenderness  There is no right CVA tenderness or left CVA tenderness  Musculoskeletal:         General: No swelling or tenderness  Normal range of motion  Cervical back: Normal range of motion  No rigidity or tenderness  Right lower leg: No edema  Left lower leg: No edema  Lymphadenopathy:      Cervical: No cervical adenopathy  Skin:     General: Skin is warm  Capillary Refill: Capillary refill takes less than 2 seconds  Coloration: Skin is not jaundiced  Findings: No bruising, erythema or rash  Neurological:      General: No focal deficit present  Mental Status: He is alert and oriented to person, place, and time  Cranial Nerves: No cranial nerve deficit  Sensory: No sensory deficit  Coordination: Coordination normal    Psychiatric:         Attention and Perception: Attention and perception normal  He is attentive  He does not perceive visual hallucinations  Mood and Affect: Mood and affect normal  Mood is not anxious, depressed or elated  Affect is not labile, blunt, flat, angry, tearful or inappropriate           Speech: Speech normal          Behavior: Behavior normal  Behavior is not agitated, aggressive, withdrawn, hyperactive or combative  Behavior is cooperative  Thought Content: Thought content normal  Thought content is not paranoid or delusional  Thought content does not include homicidal or suicidal ideation  Thought content does not include homicidal or suicidal plan  Cognition and Memory: Cognition and memory normal          Judgment: Judgment normal  Judgment is not impulsive         [address]

## 2023-05-19 NOTE — LETTER
-- DO NOT REPLY / DO NOT REPLY ALL --  -- Message is from [employer] [facility] [employer] ([facility]) --    ONLY TO BE USED WITHIN A REFILL MEDICATION ENCOUNTER    Med Refill  Is the patient currently having any symptoms?: No/Non-Emergent symptoms    Name of medication requested: See pended med    Has patient contacted the pharmacy? No, direct patient to contact pharmacy first as they will be able to process the refill request directly    Is this the first request for the medication in the last 48 hours?: Yes      Patient is requesting a medication refill - medication is on active list      Full name of the provider who ordered the medication: [facility]ino [facility] site name / Account # for provider: [id number]    Preferred Pharmacy: Pharmacy  [id number] - [place] [address] [address] [facility] [address] [facility] [address]    Patient confirmed the above pharmacy as correct?  Yes      Caller Information       Type Contact Phone/Fax    05/19/2023 08:48 AM CDT Phone (Incoming) SHANIKA COOK (Mother) [phone]          Alternative phone number: na    Can a detailed message be left?: Yes    Patient is completely out of medication: Verify if patient is currently experiencing symptoms. If patient is symptomatic, proceed with front end triage instead of medication refill. If patient is not symptomatic but is completely out of medication, tanya as High priority when routing. Inform patient: “Please call back with any questions or concerns and if your condition becomes life threatening, you should seek immediate medical assistance by calling 911 or going to the Emergency Department for evaluation.”    Inform all patients: \"If the clinical team needs to contact you regarding this refill, please be aware the return phone call may come from an unidentified or out of state phone number and your refill request will be addressed as soon as the clinical team reviews your message.\"   March 20, 2023     Patient: Lindsey Lunsford  YOB: 1960  Date of Visit: 3/20/2023      To Whom it May Concern:    Maria Teresa Castillo is under my professional care  María Caballero was seen in my office on 3/20/2023  Please excuse from work 03/14, 03/17-3/19  María Caballero may return to work on Wednesday 03/22/23  If you have any questions or concerns, please don't hesitate to call           Sincerely,          MARLEY Pugh

## 2024-08-26 ENCOUNTER — TELEPHONE (OUTPATIENT)
Age: 64
End: 2024-08-26

## 2024-08-26 NOTE — TELEPHONE ENCOUNTER
Caller: Emil    Doctor: pedrito    Reason for call: patient is going on disability and needed to know when he had surgery in the past. He will be seeing a doctor soon and needed to know the exact date.    Call back#: [phone]

## 2024-09-03 ENCOUNTER — TELEPHONE (OUTPATIENT)
Age: 64
End: 2024-09-03

## 2024-09-03 NOTE — TELEPHONE ENCOUNTER
Patient called in asking if we knew what his blood type was. Patient stated he is having a procedure done tomorrow and needs to know what his blood type is.    Please advise, thank you

## (undated) DEVICE — ULTRASOUND GEL STERILE FOIL PK

## (undated) DEVICE — GLOVE SRG BIOGEL 7.5

## (undated) DEVICE — LIGHT HANDLE COVER SLEEVE DISP BLUE STELLAR

## (undated) DEVICE — CHLORAPREP HI-LITE 26ML ORANGE

## (undated) DEVICE — GAUZE SPONGES,16 PLY: Brand: CURITY

## (undated) DEVICE — ACE WRAP 4 IN UNSTERILE

## (undated) DEVICE — BETHLEHEM UNIVERSAL MINOR GEN: Brand: CARDINAL HEALTH

## (undated) DEVICE — GLOVE INDICATOR PI UNDERGLOVE SZ 8 BLUE

## (undated) DEVICE — BAG DECANTER

## (undated) DEVICE — INTENDED FOR TISSUE SEPARATION, AND OTHER PROCEDURES THAT REQUIRE A SHARP SURGICAL BLADE TO PUNCTURE OR CUT.: Brand: BARD-PARKER SAFETY BLADES SIZE 11, STERILE

## (undated) DEVICE — ACE WRAP 6 IN UNSTERILE

## (undated) DEVICE — FIBER PROC KIT GOLD TIP 21G 45CM NEVERTOUCH

## (undated) DEVICE — TONGUE DEPRESSOR STERILE

## (undated) DEVICE — ADHESIVE SKIN HIGH VISCOSITY EXOFIN 1ML

## (undated) DEVICE — DRAPE SHEET THREE QUARTER

## (undated) DEVICE — DRAPE SHEET X-LG

## (undated) DEVICE — PADDING CAST 4 IN  COTTON STRL

## (undated) DEVICE — INTENDED FOR TISSUE SEPARATION, AND OTHER PROCEDURES THAT REQUIRE A SHARP SURGICAL BLADE TO PUNCTURE OR CUT.: Brand: BARD-PARKER SAFETY BLADES SIZE 15, STERILE

## (undated) DEVICE — Device

## (undated) DEVICE — TIBURON SPLIT SHEET: Brand: CONVERTORS

## (undated) DEVICE — COVER PROBE INTRAOPERATIVE 6 X 96 IN